# Patient Record
Sex: MALE | Race: WHITE | NOT HISPANIC OR LATINO | Employment: OTHER | URBAN - METROPOLITAN AREA
[De-identification: names, ages, dates, MRNs, and addresses within clinical notes are randomized per-mention and may not be internally consistent; named-entity substitution may affect disease eponyms.]

---

## 2023-03-29 ENCOUNTER — APPOINTMENT (EMERGENCY)
Dept: CT IMAGING | Facility: HOSPITAL | Age: 88
End: 2023-03-29

## 2023-03-29 ENCOUNTER — HOSPITAL ENCOUNTER (INPATIENT)
Facility: HOSPITAL | Age: 88
LOS: 3 days | Discharge: HOME | End: 2023-04-01
Attending: EMERGENCY MEDICINE | Admitting: INTERNAL MEDICINE

## 2023-03-29 DIAGNOSIS — R00.0 TACHYCARDIA: ICD-10-CM

## 2023-03-29 DIAGNOSIS — R09.02 HYPOXEMIA REQUIRING SUPPLEMENTAL OXYGEN: ICD-10-CM

## 2023-03-29 DIAGNOSIS — R06.00 DYSPNEA: ICD-10-CM

## 2023-03-29 DIAGNOSIS — Z99.81 HYPOXEMIA REQUIRING SUPPLEMENTAL OXYGEN: ICD-10-CM

## 2023-03-29 DIAGNOSIS — R77.8 ELEVATED TROPONIN: ICD-10-CM

## 2023-03-29 DIAGNOSIS — I26.99 BILATERAL PULMONARY EMBOLISM (HCC): Primary | ICD-10-CM

## 2023-03-29 PROBLEM — R79.89 ELEVATED TROPONIN: Status: ACTIVE | Noted: 2023-03-29

## 2023-03-29 LAB
2HR DELTA HS TROPONIN: 13 NG/L
4HR DELTA HS TROPONIN: 13 NG/L
ALBUMIN SERPL BCP-MCNC: 4.2 G/DL (ref 3.5–5)
ALP SERPL-CCNC: 85 U/L (ref 34–104)
ALT SERPL W P-5'-P-CCNC: 36 U/L (ref 7–52)
ANION GAP SERPL CALCULATED.3IONS-SCNC: 9 MMOL/L (ref 4–13)
APTT PPP: 202 SECONDS (ref 23–37)
APTT PPP: 30 SECONDS (ref 23–37)
AST SERPL W P-5'-P-CCNC: 32 U/L (ref 13–39)
ATRIAL RATE: 92 BPM
ATRIAL RATE: 98 BPM
BASOPHILS # BLD AUTO: 0.06 THOUSANDS/ÂΜL (ref 0–0.1)
BASOPHILS NFR BLD AUTO: 1 % (ref 0–1)
BILIRUB SERPL-MCNC: 0.79 MG/DL (ref 0.2–1)
BUN SERPL-MCNC: 24 MG/DL (ref 5–25)
CALCIUM SERPL-MCNC: 9.5 MG/DL (ref 8.4–10.2)
CARDIAC TROPONIN I PNL SERPL HS: 50 NG/L
CARDIAC TROPONIN I PNL SERPL HS: 63 NG/L
CARDIAC TROPONIN I PNL SERPL HS: 63 NG/L
CHLORIDE SERPL-SCNC: 110 MMOL/L (ref 96–108)
CO2 SERPL-SCNC: 23 MMOL/L (ref 21–32)
CREAT SERPL-MCNC: 1.08 MG/DL (ref 0.6–1.3)
D DIMER PPP FEU-MCNC: >20 UG/ML FEU
EOSINOPHIL # BLD AUTO: 0.05 THOUSAND/ÂΜL (ref 0–0.61)
EOSINOPHIL NFR BLD AUTO: 0 % (ref 0–6)
ERYTHROCYTE [DISTWIDTH] IN BLOOD BY AUTOMATED COUNT: 13.8 % (ref 11.6–15.1)
FLUAV RNA RESP QL NAA+PROBE: NEGATIVE
FLUBV RNA RESP QL NAA+PROBE: NEGATIVE
GFR SERPL CREATININE-BSD FRML MDRD: 61 ML/MIN/1.73SQ M
GLUCOSE SERPL-MCNC: 86 MG/DL (ref 65–140)
HCT VFR BLD AUTO: 44.7 % (ref 36.5–49.3)
HGB BLD-MCNC: 15.1 G/DL (ref 12–17)
IMM GRANULOCYTES # BLD AUTO: 0.04 THOUSAND/UL (ref 0–0.2)
IMM GRANULOCYTES NFR BLD AUTO: 0 % (ref 0–2)
INR PPP: 1.17 (ref 0.84–1.19)
LACTATE SERPL-SCNC: 1.5 MMOL/L (ref 0.5–2)
LACTATE SERPL-SCNC: 2.5 MMOL/L (ref 0.5–2)
LYMPHOCYTES # BLD AUTO: 2.7 THOUSANDS/ÂΜL (ref 0.6–4.47)
LYMPHOCYTES NFR BLD AUTO: 22 % (ref 14–44)
MCH RBC QN AUTO: 32.1 PG (ref 26.8–34.3)
MCHC RBC AUTO-ENTMCNC: 33.8 G/DL (ref 31.4–37.4)
MCV RBC AUTO: 95 FL (ref 82–98)
MONOCYTES # BLD AUTO: 1.13 THOUSAND/ÂΜL (ref 0.17–1.22)
MONOCYTES NFR BLD AUTO: 9 % (ref 4–12)
NEUTROPHILS # BLD AUTO: 8.12 THOUSANDS/ÂΜL (ref 1.85–7.62)
NEUTS SEG NFR BLD AUTO: 68 % (ref 43–75)
NRBC BLD AUTO-RTO: 0 /100 WBCS
P AXIS: 72 DEGREES
P AXIS: 75 DEGREES
PLATELET # BLD AUTO: 172 THOUSANDS/UL (ref 149–390)
PMV BLD AUTO: 11.2 FL (ref 8.9–12.7)
POTASSIUM SERPL-SCNC: 4.4 MMOL/L (ref 3.5–5.3)
PR INTERVAL: 146 MS
PR INTERVAL: 154 MS
PROCALCITONIN SERPL-MCNC: 0.05 NG/ML
PROT SERPL-MCNC: 7.2 G/DL (ref 6.4–8.4)
PROTHROMBIN TIME: 14.9 SECONDS (ref 11.6–14.5)
QRS AXIS: 93 DEGREES
QRS AXIS: 95 DEGREES
QRSD INTERVAL: 74 MS
QRSD INTERVAL: 78 MS
QT INTERVAL: 362 MS
QT INTERVAL: 390 MS
QTC INTERVAL: 462 MS
QTC INTERVAL: 482 MS
RBC # BLD AUTO: 4.7 MILLION/UL (ref 3.88–5.62)
RSV RNA RESP QL NAA+PROBE: NEGATIVE
SARS-COV-2 RNA RESP QL NAA+PROBE: NEGATIVE
SODIUM SERPL-SCNC: 142 MMOL/L (ref 135–147)
T WAVE AXIS: -19 DEGREES
T WAVE AXIS: -9 DEGREES
VENTRICULAR RATE: 92 BPM
VENTRICULAR RATE: 98 BPM
WBC # BLD AUTO: 12.1 THOUSAND/UL (ref 4.31–10.16)

## 2023-03-29 RX ORDER — ATORVASTATIN CALCIUM 20 MG/1
20 TABLET, FILM COATED ORAL DAILY
Status: DISCONTINUED | OUTPATIENT
Start: 2023-03-30 | End: 2023-04-01 | Stop reason: HOSPADM

## 2023-03-29 RX ORDER — ONDANSETRON 2 MG/ML
4 INJECTION INTRAMUSCULAR; INTRAVENOUS EVERY 6 HOURS PRN
Status: DISCONTINUED | OUTPATIENT
Start: 2023-03-29 | End: 2023-04-01 | Stop reason: HOSPADM

## 2023-03-29 RX ORDER — LEVOFLOXACIN 5 MG/ML
750 INJECTION, SOLUTION INTRAVENOUS ONCE
Status: COMPLETED | OUTPATIENT
Start: 2023-03-29 | End: 2023-03-29

## 2023-03-29 RX ORDER — PANTOPRAZOLE SODIUM 40 MG/1
40 TABLET, DELAYED RELEASE ORAL
Status: DISCONTINUED | OUTPATIENT
Start: 2023-03-30 | End: 2023-04-01 | Stop reason: HOSPADM

## 2023-03-29 RX ORDER — ASPIRIN 81 MG/1
243 TABLET, CHEWABLE ORAL ONCE
Status: COMPLETED | OUTPATIENT
Start: 2023-03-29 | End: 2023-03-29

## 2023-03-29 RX ORDER — MELATONIN
1000 DAILY
Status: DISCONTINUED | OUTPATIENT
Start: 2023-03-30 | End: 2023-04-01 | Stop reason: HOSPADM

## 2023-03-29 RX ORDER — ATORVASTATIN CALCIUM 20 MG/1
20 TABLET, FILM COATED ORAL DAILY
COMMUNITY
Start: 2022-12-12

## 2023-03-29 RX ORDER — DOCUSATE SODIUM 100 MG/1
100 CAPSULE, LIQUID FILLED ORAL 2 TIMES DAILY
Status: DISCONTINUED | OUTPATIENT
Start: 2023-03-29 | End: 2023-04-01 | Stop reason: HOSPADM

## 2023-03-29 RX ORDER — HEPARIN SODIUM 1000 [USP'U]/ML
6800 INJECTION, SOLUTION INTRAVENOUS; SUBCUTANEOUS EVERY 6 HOURS PRN
Status: DISCONTINUED | OUTPATIENT
Start: 2023-03-29 | End: 2023-04-01

## 2023-03-29 RX ORDER — HEPARIN SODIUM 1000 [USP'U]/ML
3400 INJECTION, SOLUTION INTRAVENOUS; SUBCUTANEOUS EVERY 6 HOURS PRN
Status: DISCONTINUED | OUTPATIENT
Start: 2023-03-29 | End: 2023-04-01

## 2023-03-29 RX ORDER — HEPARIN SODIUM 10000 [USP'U]/100ML
3-30 INJECTION, SOLUTION INTRAVENOUS
Status: DISCONTINUED | OUTPATIENT
Start: 2023-03-29 | End: 2023-04-01

## 2023-03-29 RX ORDER — CALCIUM CARBONATE/VITAMIN D3 600 MG-10
1200 TABLET ORAL DAILY
COMMUNITY

## 2023-03-29 RX ORDER — ACETAMINOPHEN 325 MG/1
650 TABLET ORAL EVERY 6 HOURS PRN
Status: DISCONTINUED | OUTPATIENT
Start: 2023-03-29 | End: 2023-04-01 | Stop reason: HOSPADM

## 2023-03-29 RX ORDER — ASPIRIN 81 MG/1
81 TABLET ORAL DAILY
COMMUNITY
End: 2023-04-07 | Stop reason: ALTCHOICE

## 2023-03-29 RX ORDER — ASPIRIN 81 MG/1
81 TABLET ORAL DAILY
Status: DISCONTINUED | OUTPATIENT
Start: 2023-03-30 | End: 2023-04-01 | Stop reason: HOSPADM

## 2023-03-29 RX ORDER — CHLORAL HYDRATE 500 MG
1000 CAPSULE ORAL DAILY
Status: DISCONTINUED | OUTPATIENT
Start: 2023-03-30 | End: 2023-04-01 | Stop reason: HOSPADM

## 2023-03-29 RX ORDER — PANTOPRAZOLE SODIUM 40 MG/1
40 GRANULE, DELAYED RELEASE ORAL DAILY
COMMUNITY

## 2023-03-29 RX ORDER — HEPARIN SODIUM 1000 [USP'U]/ML
6800 INJECTION, SOLUTION INTRAVENOUS; SUBCUTANEOUS ONCE
Status: COMPLETED | OUTPATIENT
Start: 2023-03-29 | End: 2023-03-29

## 2023-03-29 RX ADMIN — HEPARIN SODIUM 18 UNITS/KG/HR: 10000 INJECTION, SOLUTION INTRAVENOUS at 16:52

## 2023-03-29 RX ADMIN — ASPIRIN 243 MG: 81 TABLET, CHEWABLE ORAL at 13:46

## 2023-03-29 RX ADMIN — LEVOFLOXACIN 750 MG: 5 INJECTION, SOLUTION INTRAVENOUS at 13:54

## 2023-03-29 RX ADMIN — IOHEXOL 100 ML: 350 INJECTION, SOLUTION INTRAVENOUS at 14:51

## 2023-03-29 RX ADMIN — DOCUSATE SODIUM 100 MG: 100 CAPSULE, LIQUID FILLED ORAL at 21:32

## 2023-03-29 RX ADMIN — SODIUM CHLORIDE 500 ML: 0.9 INJECTION, SOLUTION INTRAVENOUS at 13:48

## 2023-03-29 RX ADMIN — HEPARIN SODIUM 6800 UNITS: 1000 INJECTION INTRAVENOUS; SUBCUTANEOUS at 16:46

## 2023-03-29 NOTE — ED PROVIDER NOTES
"History  Chief Complaint   Patient presents with   • Shortness of Breath     Exertional dyspnea worsening since February after he was diagnosed with Covid  Patient is an 59-year-old male who presents for shortness of breath  Patient reports that he had COVID at the beginning of February and after that felt like he never returned to his baseline  However, over the past 3 weeks has had worsening of his shortness of breath  Daughter states that when he comes to visit she noticed that he is increasingly shortness of breath particularly with stairs or walking  Patient reports that he can get short of breath while sitting but is often with walking or even mild exertion  Patient does also report intermittent lightheadedness with \"moving too fast\"  Patient denies any associated chest pain or chest tightness  Patient denies any fevers or chills  Patient denies any abdominal pain, nausea, vomiting, diarrhea  Patient denies any urinary symptoms including dysuria or hematuria  Patient denies any swelling in his lower extremities  Patient denies any history of VTE  Patient reports history of a single cardiac stent approximately 8 years ago but denies any other cardiac history  Patient is currently on aspirin but no blood thinners  Patient reports that he takes his medications regularly  Prior to Admission Medications   Prescriptions Last Dose Informant Patient Reported? Taking?    Cholecalciferol 50 MCG (2000 UT) CAPS   Yes Yes   Sig: Take 1 capsule by mouth daily with lunch   Multiple Vitamin (MULTIVITAMIN PO)   Yes Yes   Sig: Take 1 capsule by mouth daily   Omega 3 1200 MG CAPS   Yes Yes   Sig: Take 1,200 mg by mouth in the morning   aspirin (ECOTRIN LOW STRENGTH) 81 mg EC tablet   Yes Yes   Sig: Take 81 mg by mouth daily   atorvastatin (LIPITOR) 20 mg tablet   Yes Yes   Sig: Take 20 mg by mouth daily   metoprolol tartrate (LOPRESSOR) 25 mg tablet   Yes Yes   Sig: Take 12 5 mg by mouth daily " pantoprazole (PROTONIX) 40 mg   Yes Yes   Sig: Take 40 mg by mouth in the morning      Facility-Administered Medications: None       Past Medical History:   Diagnosis Date   • BPH (benign prostatic hyperplasia)    • GERD (gastroesophageal reflux disease)    • HLD (hyperlipidemia)    • Hypertension    • Nuclear sclerosis of right eye        Past Surgical History:   Procedure Laterality Date   • CARPAL TUNNEL RELEASE Left    • COLONOSCOPY     • EYE SURGERY Left    • KNEE SURGERY Left    • TRANSURETHRAL RESECTION OF PROSTATE         History reviewed  No pertinent family history  I have reviewed and agree with the history as documented  E-Cigarette/Vaping   • E-Cigarette Use Never User      E-Cigarette/Vaping Substances     Social History     Tobacco Use   • Smoking status: Never   • Smokeless tobacco: Never   Vaping Use   • Vaping Use: Never used   Substance Use Topics   • Alcohol use: Never   • Drug use: Not Currently        Review of Systems   Respiratory: Positive for shortness of breath  Neurological: Positive for light-headedness  All other systems reviewed and are negative  Physical Exam  ED Triage Vitals [03/29/23 1152]   Temperature Pulse Respirations Blood Pressure SpO2   97 8 °F (36 6 °C) (!) 111 (!) 26 151/85 (!) 85 %      Temp Source Heart Rate Source Patient Position - Orthostatic VS BP Location FiO2 (%)   Oral Monitor Sitting Right arm --      Pain Score       No Pain             Orthostatic Vital Signs  Vitals:    03/29/23 1203 03/29/23 1311 03/29/23 1400 03/29/23 1559   BP:  133/75 144/79 147/89   Pulse: 101 81 75 87   Patient Position - Orthostatic VS:    Lying       Physical Exam  Vitals reviewed  Constitutional:       General: He is not in acute distress  Appearance: He is well-developed  He is not toxic-appearing or diaphoretic  HENT:      Head: Normocephalic and atraumatic        Right Ear: External ear normal       Left Ear: External ear normal       Nose: Nose normal  Mouth/Throat:      Mouth: Mucous membranes are moist       Pharynx: Oropharynx is clear  Eyes:      Conjunctiva/sclera: Conjunctivae normal    Cardiovascular:      Rate and Rhythm: Regular rhythm  Tachycardia present  Heart sounds: Normal heart sounds  No murmur heard  Pulmonary:      Effort: Pulmonary effort is normal  No respiratory distress  Breath sounds: Normal breath sounds  No stridor  No wheezing, rhonchi or rales  Abdominal:      General: There is no distension  Palpations: Abdomen is soft  Tenderness: There is no abdominal tenderness  Musculoskeletal:         General: Normal range of motion  Cervical back: Normal range of motion and neck supple  Right lower leg: No edema  Left lower leg: No edema  Skin:     General: Skin is warm and dry  Capillary Refill: Capillary refill takes less than 2 seconds  Coloration: Skin is not pale  Findings: No erythema or rash  Neurological:      Mental Status: He is alert and oriented to person, place, and time  Sensory: No sensory deficit  Coordination: Coordination normal    Psychiatric:         Speech: Speech normal          Behavior: Behavior is cooperative           ED Medications  Medications   heparin (porcine) injection 6,800 Units (has no administration in time range)   heparin (porcine) 25,000 units in 0 45% NaCl 250 mL infusion (premix) (has no administration in time range)   heparin (porcine) injection 6,800 Units (has no administration in time range)   heparin (porcine) injection 3,400 Units (has no administration in time range)   aspirin chewable tablet 243 mg (243 mg Oral Given 3/29/23 1346)   sodium chloride 0 9 % bolus 500 mL (500 mL Intravenous New Bag 3/29/23 1348)   levofloxacin (LEVAQUIN) IVPB (premix in dextrose) 750 mg 150 mL (750 mg Intravenous New Bag 3/29/23 1354)   iohexol (OMNIPAQUE) 350 MG/ML injection (SINGLE-DOSE) 100 mL (100 mL Intravenous Given 3/29/23 1451) Diagnostic Studies  Results Reviewed     Procedure Component Value Units Date/Time    HS Troponin I 2hr [[id number]]  (Abnormal) Collected: 03/29/23 1531    Lab Status: Final result Specimen: Blood from Arm, Right Updated: 03/29/23 1611     hs TnI 2hr 63 ng/L      Delta 2hr hsTnI 13 ng/L     Lactic acid 2 Hours [[id number]]  (Normal) Collected: 03/29/23 1531    Lab Status: Final result Specimen: Blood from Arm, Right Updated: 03/29/23 1557     LACTIC ACID 1 5 mmol/L     Narrative:      Result may be elevated if tourniquet was used during collection  HS Troponin I 4hr [[id number]]     Lab Status: No result Specimen: Blood     Procalcitonin [[id number]]  (Normal) Collected: 03/29/23 1234    Lab Status: Final result Specimen: Blood from Arm, Left Updated: 03/29/23 1340     Procalcitonin 0 05 ng/ml     FLU/RSV/COVID - if FLU/RSV clinically relevant [[id number]]  (Normal) Collected: 03/29/23 1239    Lab Status: Final result Specimen: Nares from Nose Updated: 03/29/23 1328     SARS-CoV-2 Negative     INFLUENZA A PCR Negative     INFLUENZA B PCR Negative     RSV PCR Negative    Narrative:      FOR PEDIATRIC PATIENTS - copy/paste COVID Guidelines URL to browser: [email or web address]  [email or web address]    SARS-CoV-2 assay is a Nucleic Acid Amplification assay intended for the  qualitative detection of nucleic acid from SARS-CoV-2 in nasopharyngeal  swabs  Results are for the presumptive identification of SARS-CoV-2 RNA  Positive results are indicative of infection with SARS-CoV-2, the virus  causing COVID-19, but do not rule out bacterial infection or co-infection  with other viruses  Laboratories within the [place] and its  territories are required to report all positive results to the appropriate  public health authorities  Negative results do not preclude SARS-CoV-2  infection and should not be used as the sole basis for treatment or other  patient management decisions  Negative results must be combined with  clinical observations, patient history, and epidemiological information  This test has not been FDA cleared or approved  This test has been authorized by FDA under an Emergency Use Authorization  (EUA)  This test is only authorized for the duration of time the  declaration that circumstances exist justifying the authorization of the  emergency use of an in vitro diagnostic tests for detection of SARS-CoV-2  virus and/or diagnosis of COVID-19 infection under section 564(b)(1) of  the Act, 21 U  S C  050NDQ-3(K)(0), unless the authorization is terminated  or revoked sooner  The test has been validated but independent review by FDA  and CLIA is pending  Test performed using [employer] GeneXpert: This RT-PCR assay targets N2,  a region unique to SARS-CoV-2  A conserved region in the E-gene was chosen  for pan-Sarbecovirus detection which includes SARS-CoV-2  According to CMS-2020-01-R, this platform meets the definition of high-throughput technology  APTT [[id number]]  (Normal) Collected: 03/29/23 1234    Lab Status: Final result Specimen: Blood from Arm, Left Updated: 03/29/23 1327     PTT 30 seconds     D-Dimer [[id number]]  (Abnormal) Collected: 03/29/23 1234    Lab Status: Final result Specimen: Blood from Arm, Left Updated: 03/29/23 1327     D-Dimer, Quant >20 00 ug/ml FEU     Narrative: In the evaluation for possible pulmonary embolism, in the appropriate (Well's Score of 4 or less) patient, the age adjusted d-dimer cutoff for this patient can be calculated as:    Age x 0 01 (in ug/mL) for Age-adjusted D-dimer exclusion threshold for a patient over 50 years      Protime-INR [[id number]]  (Abnormal) Collected: 03/29/23 1234    Lab Status: Final result Specimen: Blood from Arm, Left Updated: 03/29/23 1327     Protime 14 9 seconds      INR 1 17    Lactic acid [[id number]]  (Abnormal) Collected: 03/29/23 1234    Lab Status: Final result Specimen: Blood from Arm, Left Updated: 03/29/23 1326     LACTIC ACID 2 5 mmol/L     Narrative:      Result may be elevated if tourniquet was used during collection  HS Troponin 0hr (reflex protocol) [[id number]]  (Abnormal) Collected: 03/29/23 1234    Lab Status: Final result Specimen: Blood from Arm, Left Updated: 03/29/23 1311     hs TnI 0hr 50 ng/L     Comprehensive metabolic panel [[id number]]  (Abnormal) Collected: 03/29/23 1234    Lab Status: Final result Specimen: Blood from Arm, Left Updated: 03/29/23 1307     Sodium 142 mmol/L      Potassium 4 4 mmol/L      Chloride 110 mmol/L      CO2 23 mmol/L      ANION GAP 9 mmol/L      BUN 24 mg/dL      Creatinine 1 08 mg/dL      Glucose 86 mg/dL      Calcium 9 5 mg/dL      AST 32 U/L      ALT 36 U/L      Alkaline Phosphatase 85 U/L      Total Protein 7 2 g/dL      Albumin 4 2 g/dL      Total Bilirubin 0 79 mg/dL      eGFR 61 ml/min/1 73sq m     Narrative:      [place] guidelines for Chronic Kidney Disease (CKD):   •  Stage 1 with normal or high GFR (GFR > 90 mL/min/1 73 square meters)  •  Stage 2 Mild CKD (GFR = 60-89 mL/min/1 73 square meters)  •  Stage 3A Moderate CKD (GFR = 45-59 mL/min/1 73 square meters)  •  Stage 3B Moderate CKD (GFR = 30-44 mL/min/1 73 square meters)  •  Stage 4 Severe CKD (GFR = 15-29 mL/min/1 73 square meters)  •  Stage 5 End Stage CKD (GFR <15 mL/min/1 73 square meters)  Note: GFR calculation is accurate only with a steady state creatinine    Blood culture #2 [[id number]] Collected: 03/29/23 1244    Lab Status:  In process Specimen: Blood from Hand, Right Updated: 03/29/23 1249    CBC and differential [[id number]]  (Abnormal) Collected: 03/29/23 1234    Lab Status: Final result Specimen: Blood from Arm, Left Updated: 03/29/23 1248     WBC 12 10 Thousand/uL      RBC 4 70 Million/uL      Hemoglobin 15 1 g/dL      Hematocrit 44 7 %      MCV 95 fL      MCH 32 1 pg      MCHC 33 8 g/dL      RDW 13 8 %      MPV 11 2 fL      Platelets 619 Thousands/uL nRBC 0 /100 WBCs      Neutrophils Relative 68 %      Immat GRANS % 0 %      Lymphocytes Relative 22 %      Monocytes Relative 9 %      Eosinophils Relative 0 %      Basophils Relative 1 %      Neutrophils Absolute 8 12 Thousands/µL      Immature Grans Absolute 0 04 Thousand/uL      Lymphocytes Absolute 2 70 Thousands/µL      Monocytes Absolute 1 13 Thousand/µL      Eosinophils Absolute 0 05 Thousand/µL      Basophils Absolute 0 06 Thousands/µL     Blood culture #1 [[id number]] Collected: 03/29/23 1234    Lab Status: In process Specimen: Blood from Arm, Left Updated: 03/29/23 1245                 CTA ED chest PE study   ED Interpretation by ST SOBIA DO (03/29 4989)   Abnormal   Interpreted by me as b/l PE  Will order anticoagulation  A/w radiologist interpretation  Final Result by Tramaine Najera MD (03/29 4100)      Bilateral acute occlusive pulmonary emboli extending from the distal lobar pulmonary arteries into the segmental and subsegmental branches  The calculated ratio of right ventricular to left ventricular diameter (RV/LV ratio) is 1 4      This is greater than 0 9, which is abnormal and indicates right heart strain  An abnormal RV/LV ratio has been shown to be associated with an increased risk of 30 day mortality in the setting of acute pulmonary embolism  Findings concur with the preliminary report by the referring clinician already in PACS   The study was marked in [facility] for immediate notification  and/or our electronic record EPIC  Workstation performed: [id number]               Procedures  Procedures      ED Course  ED Course as of 03/29/23 1614   Wed Mar 29, 2023   1216 SpO2(!): 85 %  On RA   1216 SpO2: 96 %  Improved with 4L NC   1216 Temperature: 97 8 °F (36 6 °C)  Afebrile   1216 Pulse(!): 111  Tachycardic  This along with tachypnea meeting SIRS criteria   Will initiate sepsis w/u including BC x2     1217 ECG 12 lead  Procedure Note: EKG  Date/Time: 03/29/23 12:18 PM   Interpreted by: Carmel Vitale MD  Indications / Diagnosis: Tachycardia  ECG reviewed by me, the ED Physician: yes   The EKG demonstrates:  Rhythm: normal sinus with occasional PVCs  Intervals: normal intervals  Axis: Borderline RAD  QRS/Blocks: normal QRS  ST Changes: No acute ST Changes, no STD/CHASIDY  T wave inversions present in inferior and septolateral leads  No prior available for comparison  1308 WBC(!): 12 10  Elevated  Septic w/u already initiated  Improved when compared to outpt labs earlier this week with WBC 13     1308 CBC and differential(!)  Reviewed and without actionable derangement  1308 Comprehensive metabolic panel(!)  Reviewed and without actionable derangement  1313 hs TnI 0hr(!): 50  Elevated  Will require delta  Will also give ASA  1328 D-Dimer, Quant(!): >20 00  Will order CTA for further w/u for PE     1328 LACTIC ACID(!!): 2 5  Will order fluids and initiate abx     1328 POCT INR: 1 17  WNL   1328 PTT: 30  WNL   1328 FLU/RSV/COVID - if FLU/RSV clinically relevant  Negative  1342 Procalcitonin: 0 05  Negative  46 Pt and daughter updated on results thus far and plan for ASA, abx, and CTA  Both agreeable  1502 CTA ED chest PE study  Significant PEs on wet read  Will initiate VTE heparin  0 Pt and family made aware of wet read of CTA and plan for heparin  All agreeable  Heparin ordered  Awaiting formal read of CTA prior to dispo  0 CTA ED chest PE study  Bilateral acute occlusive pulmonary emboli extending from the distal lobar pulmonary arteries into the segmental and subsegmental branches  The calculated ratio of right ventricular to left ventricular diameter (RV/LV ratio) is 1 4   1529 CC contacted via TT     1551 CC reviewed and stated okay for SD2  Will reach out to [facility]  1558 LACTIC ACID: 1 5  Interval improvement  [address] contacted via TT for admission  [address] hsTnI: 13  Will continue to trend  "            HEART Risk Score    Flowsheet Row Most Recent Value   Heart Score Risk Calculator    History 1 Filed at: 03/29/2023 1314   ECG 1 Filed at: 03/29/2023 1314   Age 2 Filed at: 03/29/2023 1314   Risk Factors 2 Filed at: 03/29/2023 1314   Troponin 2 Filed at: 03/29/2023 1314   HEART Score 8 Filed at: 03/29/2023 1314                   Initial Sepsis Screening     Row Name 03/29/23 1402 03/29/23 1346             Is the patient's history suggestive of a new or worsening infection? Yes (Proceed)  -CR Yes (Proceed)  -KW       Suspected source of infection pneumonia  -CR suspect infection, source unknown  -KW       Indicate SIRS criteria Tachycardia > 90 bpm;Tachypnea > 20 resp per min;Leukocytosis (WBC > 57937 IJL) OR Leukopenia (WBC <4000 IJL) OR Bandemia (WBC >10% bands)  -CR Leukocytosis (WBC > 61633 IJL) OR Leukopenia (WBC <4000 IJL) OR Bandemia (WBC >10% bands); Tachycardia > 90 bpm;Tachypnea > 20 resp per min  -KW       Are two or more of the above signs & symptoms of infection both present and new to the patient? Yes (Proceed)  -CR Yes (Proceed)  -KW       Assess for evidence of organ dysfunction: Are any of the below criteria present within 6 hours of suspected infection and SIRS criteria that are NOT considered to be chronic conditions? Lactate > 2 0  -CR Lactate > 2 0  -KW       Date of presentation of severe sepsis 03/29/23  -CR 03/29/23  -KW       Time of presentation of severe sepsis 1328  -CR 1320  -KW       Sepsis Note: Click \"NEXT\" below (NOT \"close\") to generate sepsis note based on above information   YES (proceed by clicking \"NEXT\")  -CR YES (proceed by clicking \"NEXT\")  -KW             User Key  (r) = Recorded By, (t) = Taken By, (c) = Cosigned By    [address] Name Provider Type    CR [address], DO Physician    Romina Paredes MD Resident              Default Flowsheet Data (last 720 hours)     Sepsis Reassess     Row Name 03/29/23 1502                   Repeat Volume Status and Tissue " "Perfusion Assessment Performed    Date of Reassessment: 03/29/23  -KW        Time of Reassessment: 1504  -KW        Sepsis Reassessment Note: Click \"NEXT\" below (NOT \"close\") to generate sepsis reassessment note  YES (proceed by clicking \"NEXT\")  -KW        Repeat Volume Status and Tissue Perfusion Assessment Performed --              User Key  (r) = Recorded By, (t) = Taken By, (c) = Cosigned By    [address] Name Provider Vanita Basilio MD Resident              SBIRT 22yo+    Flowsheet Row Most Recent Value   SBIRT (23 yo +)    In order to provide better care to our patients, we are screening all of our patients for alcohol and drug use  Would it be okay to ask you these screening questions? Yes Filed at: 03/29/2023 1235   Initial Alcohol Screen: US AUDIT-C     1  How often do you have a drink containing alcohol? 0 Filed at: 03/29/2023 1235   2  How many drinks containing alcohol do you have on a typical day you are drinking? 0 Filed at: 03/29/2023 1235   3a  Male UNDER 65: How often do you have five or more drinks on one occasion? 0 Filed at: 03/29/2023 1235   3b  FEMALE Any Age, or MALE 65+: How often do you have 4 or more drinks on one occassion? 0 Filed at: 03/29/2023 1235   Audit-C Score 0 Filed at: 03/29/2023 1235   CASSANDRA: How many times in the past year have you    Used an illegal drug or used a prescription medication for non-medical reasons? Never Filed at: 03/29/2023 1235                Medical Decision Making  Pt is a 79yo M who presents with shortness of breath  Exam pertinent for tachycardia  Differential diagnosis to include but not limited to pneumonia, viral illness, PE, anemia, post-COVID syndrome, pleural effusion, pneumothorax  Will plan for sepsis labs in setting of tachycardia and tachypnea  Will also obtain EKG and troponin due to cardiac history  Will obtain D-dimer to rule out PE    Patient largely asymptomatic and does not require any intervention at this time aside from " nasal cannula which is already in place  See ED course for results and details  Plan to admit pt to [facility]  Pt discussed with admitting team and admission orders placed  Pt admitted without incident  Amount and/or Complexity of Data Reviewed  Labs: ordered  Decision-making details documented in ED Course  Radiology: ordered and independent interpretation performed  Decision-making details documented in ED Course  ECG/medicine tests:  Decision-making details documented in ED Course  Risk  OTC drugs  Prescription drug management  Decision regarding hospitalization  Disposition  Final diagnoses:   Dyspnea   Hypoxemia requiring supplemental oxygen   Tachycardia   Bilateral pulmonary embolism (HCC)   Elevated troponin     Time reflects when diagnosis was documented in both MDM as applicable and the Disposition within this note     Time User Action Codes Description Comment    3/29/2023 12:23 PM Sydna Rang Add [R06 00] Dyspnea     3/29/2023 12:23 PM [place] Maisha [R09 02,  Z99 81] Hypoxemia requiring supplemental oxygen     3/29/2023 12:23 PM Sydna Rang Add [R00 0] Tachycardia     3/29/2023  3:46 PM Juan, [facility] Add [I26 99] Bilateral pulmonary embolism ([facility] [address] )     3/29/2023  3:46 PM Juan, [facility] [R06 00] Dyspnea     3/29/2023  3:46 PM Juan, Crystal L Modify [I26 99] Bilateral pulmonary embolism ([facility] [address] )     3/29/2023  4:13 PM Sydna Rang Add [R77 8] Elevated troponin       ED Disposition     ED Disposition   Admit    Condition   Stable    Date/Time   Wed Mar 29, 2023  4:13 PM    Comment   Case was discussed with ALEKSANDER and the patient's admission status was agreed to be Admission Status: inpatient status to the service of Dr Eddie Cutler  Follow-up Information    None         Patient's Medications   Discharge Prescriptions    No medications on file     No discharge procedures on file      PDMP Review     None           ED Provider  Attending physically available and evaluated Halle Correa I managed the patient along with the ED Attending      Electronically Signed by         Fabian Marte MD  03/29/23 6385

## 2023-03-29 NOTE — ED ATTENDING ATTESTATION
3/29/2023  IAmira DO, saw and evaluated the patient  I have discussed the patient with the resident/non-physician practitioner and agree with the resident's/non-physician practitioner's findings, Plan of Care, and MDM as documented in the resident's/non-physician practitioner's note, except where noted  All available labs and Radiology studies were reviewed  I was present for key portions of any procedure(s) performed by the resident/non-physician practitioner and I was immediately available to provide assistance  At this point I agree with the current assessment done in the Emergency Department  I have conducted an independent evaluation of this patient a history and physical is as follows:    80 y o  M w/h/o CAD s/p stent p/w BRADLEY x 2 months  Additional history also provided by family in room  Pt having SOB since being diagnosed with COVID last month  Having worsening SOB over the past few days  Pt saw his PCP 2 days ago and had labs/CXR that were unremarkable  Pt comes in today for not feeling well  Pt denies fevers, cough, CP, abd pain, N/V, LE edema  Plan: Labs including dimer  If dimer neg, will order CXR  ED Course  ED Course as of 03/29/23 1619   Wed Mar 29, 2023   1201 SpO2(!): 85 %  Hypoxic    Placed on 4L NC    1201 Respirations(!): 26  Tachypneic   1201 Pulse(!): 111  Tachycardic   1250 WBC(!): 12 10  Elevated         Critical Care Time  CriticalCare Time    Date/Time: 3/29/2023 12:43 PM  Performed by: DO Amira  Authorized by: DO Amira     Critical care provider statement:     Critical care time (minutes):  75    Critical care time was exclusive of:  Separately billable procedures and treating other patients and teaching time    Critical care was necessary to treat or prevent imminent or life-threatening deterioration of the following conditions:  Respiratory failure    Critical care was time spent personally by me on the following activities: Blood draw for specimens, obtaining history from patient or surrogate, development of treatment plan with patient or surrogate, discussions with consultants, evaluation of patient's response to treatment, examination of patient, ordering and performing treatments and interventions, ordering and review of laboratory studies, ordering and review of radiographic studies, re-evaluation of patient's condition and review of old charts    I assumed direction of critical care for this patient from another provider in my specialty: no    Comments:      Pt with hypoxia requiring O2 supplementation and significant PEs requiring anticoagulation

## 2023-03-29 NOTE — ASSESSMENT & PLAN NOTE
Troponins noted to be elevated in the ED; 50--63--63  Stable; EKG with some T wave abnormality; but no other acute ischemic findings  Patient denies chest pain  Troponins likely elevated in the setting of demand ischemia given bilateral Pes  Monitor on telemetry; on heparin drip; consider consult to cardiology as needed

## 2023-03-29 NOTE — SEPSIS NOTE
"  Sepsis Note   Makayla Cano 80 y o  male MRN: [id number]  Unit/Bed#: ED-10 Encounter: [id number]       Initial Sepsis Screening     Row Name 03/29/23 1346                Is the patient's history suggestive of a new or worsening infection? Yes (Proceed)  -KW        Suspected source of infection suspect infection, source unknown  -KW        Indicate SIRS criteria Leukocytosis (WBC > 41981 IJL) OR Leukopenia (WBC <4000 IJL) OR Bandemia (WBC >10% bands); Tachycardia > 90 bpm;Tachypnea > 20 resp per min  -KW        Are two or more of the above signs & symptoms of infection both present and new to the patient? Yes (Proceed)  -KW        Assess for evidence of organ dysfunction: Are any of the below criteria present within 6 hours of suspected infection and SIRS criteria that are NOT considered to be chronic conditions? Lactate > 2 0  -KW        Date of presentation of severe sepsis 03/29/23  -KW        Time of presentation of severe sepsis 1320  -KW        Sepsis Note: Click \"NEXT\" below (NOT \"close\") to generate sepsis note based on above information  YES (proceed by clicking \"NEXT\")  -KW              User Key  (r) = Recorded By, (t) = Taken By, (c) = Cosigned By    [address] Name Provider Treasure Briones MD Resident                    Body mass index is 32 28 kg/m²    Wt Readings from Last 1 Encounters:   03/29/23 88 kg (194 lb 0 1 oz)     IBW (Ideal Body Weight): 61 5 kg    Ideal body weight: 61 5 kg (135 lb 9 3 oz)  Adjusted ideal body weight: 72 1 kg (158 lb 15 2 oz)  "

## 2023-03-29 NOTE — H&P
"[facility]  H&P  Name: Luba Garay  MRN: [id number]  Unit/Bed#: E4 -01 I Date of Admission: 3/29/2023   Date of Service: 3/29/2023 I Hospital Day: 0      Assessment/Plan   Elevated troponin  Assessment & Plan  Troponins noted to be elevated in the ED; 50--63--63  Stable; EKG with some T wave abnormality; but no other acute ischemic findings  Patient denies chest pain  Troponins likely elevated in the setting of demand ischemia given bilateral Pes  Monitor on telemetry; on heparin drip; consider consult to cardiology as needed    Hypertension  Assessment & Plan  Blood pressure well controlled on presentation, continue home Lopressor with hold parameters    HLD (hyperlipidemia)  Assessment & Plan  Continue statin therapy    GERD (gastroesophageal reflux disease)  Assessment & Plan  Continue PPI therapy    BPH (benign prostatic hyperplasia)  Assessment & Plan  Monitor ins and outs  Urinary retention protocol    * Acute pulmonary embolism without acute cor pulmonale (HCC)  Assessment & Plan  · Patient presents with several weeks of worsening shortness of breath; recent diagnosis of COVID 1 month ago  · In the ED noted to be tachycardic, tachypneic, with slight increase in white blood count; sepsis alert called  · However, subsequently found to have D-dimer > 20 00; CTA showed the following:  \"Bilateral acute occlusive pulmonary emboli extending from the distal lobar pulmonary arteries into the segmental and subsegmental branches  The calculated ratio of right ventricular to left ventricular diameter (RV/LV ratio) is 1 4  This is greater than 0 9, which is abnormal and indicates right heart strain  An abnormal RV/LV ratio has been shown to be associated with an increased risk of 30 day mortality in the setting of acute pulmonary embolism  \"  · Initiate on heparin drip; consider transition to NOAC pending insurance coverage  · Supportive care; requiring 2 L nasal cannula with SPO2 of " 97%  · Lactic acid trended from 2 5-1 5; procalcitonin negative  · Did receive antibiotics in the ED; will monitor off antibiotics at this time  · Repeat procalcitonin; consider restarting antibiotics as needed  · Telemetry; thrombosis panel  · Consider consult to pulmonology as needed  · Likely provoked in the setting of acute illness given recent COVID diagnosis and patient's inactivity at that time           VTE Prophylaxis: Heparin Drip  / sequential compression device   Code Status: Full  POLST: POLST form is not discussed and not completed at this time  Discussion with family: Discussed treatment plan with family and patient who agree with current plan; encouraged to ask questions and participate  Anticipated Length of Stay:  Patient will be admitted on an Inpatient basis with an anticipated length of stay of greater than 2 midnights  Justification for Hospital Stay: Bilateral pulmonary emboli    Total Time for Visit, including Counseling / Coordination of Care: 60 minutes  Greater than 50% of this total time spent on direct patient counseling and coordination of care  Chief Complaint:   Worsening shortness of breath x3 weeks    History of Present Illness:    Eric Reaves is a 80 y o  male BPH, GERD, hyperlipidemia, hypertension who presents with worsening shortness of breath and dyspnea on exertion X 3 weeks  Patient reports COVID diagnosis in February  Since that time, has not returned to 100% respiratory status  Over the last 3 weeks, however, this is gotten acutely worse has seen medical providers, but no alleviation of symptoms with inhalers or other treatments  In the ED, noted to have highly elevated D-dimer; CTA showed bilateral pulmonary emboli with right heart strain  Patient started on heparin drip and will be admitted overnight for observation  Patient also reports occasional headaches, but no history of cough, fever/chills, nausea/vomiting, diarrhea/constipation    Noted to be SIRS positive in the ED and given one-time dose of antibiotics, but likely tachycardia and tachypnea secondary to pulmonary emboli burden  Patient does report long period of inactivity during COVID illness; likely provoked emboli  Review of Systems:    Review of Systems   Constitutional: Positive for activity change  Negative for chills and fever  HENT: Negative for ear pain and sore throat  Eyes: Negative for pain and visual disturbance  Respiratory: Positive for shortness of breath  Negative for cough  Cardiovascular: Negative for chest pain and palpitations  Gastrointestinal: Negative for abdominal pain and vomiting  Genitourinary: Negative for dysuria and hematuria  Musculoskeletal: Negative for arthralgias and back pain  Skin: Negative for color change and rash  Neurological: Positive for headaches  Negative for seizures and syncope  All other systems reviewed and are negative  Past Medical and Surgical History:     Past Medical History:   Diagnosis Date   • BPH (benign prostatic hyperplasia)    • GERD (gastroesophageal reflux disease)    • HLD (hyperlipidemia)    • Hypertension    • Nuclear sclerosis of right eye        Past Surgical History:   Procedure Laterality Date   • CARPAL TUNNEL RELEASE Left    • COLONOSCOPY     • EYE SURGERY Left    • KNEE SURGERY Left    • TRANSURETHRAL RESECTION OF PROSTATE         Meds/Allergies:    Prior to Admission medications    Medication Sig Start Date End Date Taking?  Authorizing Provider   aspirin (ECOTRIN LOW STRENGTH) 81 mg EC tablet Take 81 mg by mouth daily   Yes Historical Provider, MD   atorvastatin (LIPITOR) 20 mg tablet Take 20 mg by mouth daily 12/12/22  Yes Historical Provider, MD   Cholecalciferol 50 MCG (2000 UT) CAPS Take 1 capsule by mouth daily with lunch   Yes Historical Provider, MD   metoprolol tartrate (LOPRESSOR) 25 mg tablet Take 12 5 mg by mouth daily 12/12/22  Yes Historical Provider, MD   Multiple Vitamin (MULTIVITAMIN PO) "Take 1 capsule by mouth daily   Yes Historical Provider, MD   Omega 3 1200 MG CAPS Take 1,200 mg by mouth in the morning   Yes Historical Provider, MD   pantoprazole (PROTONIX) 40 mg Take 40 mg by mouth in the morning   Yes Historical Provider, MD     I have reviewed home medications using allscripts  Allergies: Allergies   Allergen Reactions   • Cefaclor Other (See Comments)   • Ibuprofen Other (See Comments)   • Cephalosporins Rash   • Penicillins Rash   • Sulfa Antibiotics Other (See Comments) and Rash     Rash - takes flomax         Social History:     Marital Status:    Occupation:   Patient Pre-hospital Living Situation: Independently  Patient Pre-hospital Level of Mobility: Full  Patient Pre-hospital Diet Restrictions: None  Substance Use History:   Social History     Substance and Sexual Activity   Alcohol Use Never     Social History     Tobacco Use   Smoking Status Never   Smokeless Tobacco Never     Social History     Substance and Sexual Activity   Drug Use Not Currently       Family History:    History reviewed  No pertinent family history  Physical Exam:     Vitals:   Blood Pressure: (!) 141/102 (03/29/23 1832)  Pulse: 88 (03/29/23 1832)  Temperature: 97 8 °F (36 6 °C) (03/29/23 1832)  Temp Source: Temporal (03/29/23 1832)  Respirations: 20 (03/29/23 1832)  Height: 5' 5\" (165 1 cm) (03/29/23 1152)  Weight - Scale: 88 kg (194 lb 0 1 oz) (03/29/23 1152)  SpO2: 91 % (03/29/23 1832)    Physical Exam  Vitals and nursing note reviewed  Constitutional:       General: He is not in acute distress  Appearance: He is well-developed  HENT:      Head: Normocephalic and atraumatic  Eyes:      Conjunctiva/sclera: Conjunctivae normal    Cardiovascular:      Rate and Rhythm: Regular rhythm  Tachycardia present  Heart sounds: No murmur heard  Pulmonary:      Effort: No respiratory distress  Breath sounds: Normal breath sounds  Abdominal:      Palpations: Abdomen is soft        " Tenderness: There is no abdominal tenderness  Musculoskeletal:         General: No swelling  Cervical back: Neck supple  Skin:     General: Skin is warm and dry  Neurological:      Mental Status: He is alert and oriented to person, place, and time  Psychiatric:         Mood and Affect: Mood normal              Additional Data:     Lab Results: I have personally reviewed pertinent reports  Results from last 7 days   Lab Units 03/29/23  1234   WBC Thousand/uL 12 10*   HEMOGLOBIN g/dL 15 1   HEMATOCRIT % 44 7   PLATELETS Thousands/uL 172   NEUTROS PCT % 68   LYMPHS PCT % 22   MONOS PCT % 9   EOS PCT % 0     Results from last 7 days   Lab Units 03/29/23  1234   SODIUM mmol/L 142   POTASSIUM mmol/L 4 4   CHLORIDE mmol/L 110*   CO2 mmol/L 23   BUN mg/dL 24   CREATININE mg/dL 1 08   ANION GAP mmol/L 9   CALCIUM mg/dL 9 5   ALBUMIN g/dL 4 2   TOTAL BILIRUBIN mg/dL 0 79   ALK PHOS U/L 85   ALT U/L 36   AST U/L 32   GLUCOSE RANDOM mg/dL 86     Results from last 7 days   Lab Units 03/29/23  1234   INR  1 17             Results from last 7 days   Lab Units 03/29/23  1531 03/29/23  1234   LACTIC ACID mmol/L 1 5 2 5*   PROCALCITONIN ng/ml  --  0 05       Imaging: I have personally reviewed pertinent reports  CTA ED chest PE study   ED Interpretation by ST SOBIA DO (03/29 1503)   Abnormal   Interpreted by me as b/l PE  Will order anticoagulation  A/w radiologist interpretation  Final Result by Warren Collazo MD (03/29 1527)      Bilateral acute occlusive pulmonary emboli extending from the distal lobar pulmonary arteries into the segmental and subsegmental branches  The calculated ratio of right ventricular to left ventricular diameter (RV/LV ratio) is 1 4      This is greater than 0 9, which is abnormal and indicates right heart strain  An abnormal RV/LV ratio has been shown to be associated with an increased risk of 30 day mortality in the setting of acute pulmonary embolism  Findings concur with the preliminary report by the referring clinician already in PACS   The study was marked in [facility] for immediate notification  and/or our electronic record EPIC  Workstation performed: [id number]             EKG, Pathology, and Other Studies Reviewed on Admission:   · EKG: Sinus rhythm with marked sinus arrhythmia with occasional PVCs    Allscripts / Epic Records Reviewed: Yes     ** Please Note: This note has been constructed using a voice recognition system   **

## 2023-03-29 NOTE — SEPSIS NOTE
"  Sepsis Note   Jignesh García 80 y o  male MRN: [id number]  Unit/Bed#: ED-10 Encounter: [id number]       Initial Sepsis Screening     [address] Name 03/29/23 1402 03/29/23 1346             Is the patient's history suggestive of a new or worsening infection? Yes (Proceed)  -CR Yes (Proceed)  -KW       Suspected source of infection pneumonia  -CR suspect infection, source unknown  -KW       Indicate SIRS criteria Tachycardia > 90 bpm;Tachypnea > 20 resp per min;Leukocytosis (WBC > 31699 IJL) OR Leukopenia (WBC <4000 IJL) OR Bandemia (WBC >10% bands)  -CR Leukocytosis (WBC > 67085 IJL) OR Leukopenia (WBC <4000 IJL) OR Bandemia (WBC >10% bands); Tachycardia > 90 bpm;Tachypnea > 20 resp per min  -KW       Are two or more of the above signs & symptoms of infection both present and new to the patient? Yes (Proceed)  -CR Yes (Proceed)  -KW       Assess for evidence of organ dysfunction: Are any of the below criteria present within 6 hours of suspected infection and SIRS criteria that are NOT considered to be chronic conditions? Lactate > 2 0  -CR Lactate > 2 0  -KW       Date of presentation of severe sepsis 03/29/23  -CR 03/29/23  -KW       Time of presentation of severe sepsis 1328  -CR 1320  -KW       Sepsis Note: Click \"NEXT\" below (NOT \"close\") to generate sepsis note based on above information  YES (proceed by clicking \"NEXT\")  -CR YES (proceed by clicking \"NEXT\")  -KW             User Key  (r) = Recorded By, (t) = Taken By, (c) = Cosigned By    [address] Name Provider Type    CR Melvin Roberts DO Physician    Giuliana Reyes MD Resident                    Body mass index is 32 28 kg/m²    Wt Readings from Last 1 Encounters:   03/29/23 88 kg (194 lb 0 1 oz)     IBW (Ideal Body Weight): 61 5 kg    Ideal body weight: 61 5 kg (135 lb 9 3 oz)  Adjusted ideal body weight: 72 1 kg (158 lb 15 2 oz)  "

## 2023-03-29 NOTE — ASSESSMENT & PLAN NOTE
"· Patient presents with several weeks of worsening shortness of breath; recent diagnosis of COVID 1 month ago  · In the ED noted to be tachycardic, tachypneic, with slight increase in white blood count; sepsis alert called  · However, subsequently found to have D-dimer > 20 00; CTA showed the following:  \"Bilateral acute occlusive pulmonary emboli extending from the distal lobar pulmonary arteries into the segmental and subsegmental branches  The calculated ratio of right ventricular to left ventricular diameter (RV/LV ratio) is 1 4  This is greater than 0 9, which is abnormal and indicates right heart strain  An abnormal RV/LV ratio has been shown to be associated with an increased risk of 30 day mortality in the setting of acute pulmonary embolism  \"  · Initiate on heparin drip; consider transition to NOAC pending insurance coverage  · Supportive care; requiring 2 L nasal cannula with SPO2 of 97%  · Lactic acid trended from 2 5-1 5; procalcitonin negative  · Did receive antibiotics in the ED; will monitor off antibiotics at this time  · Repeat procalcitonin; consider restarting antibiotics as needed  · Telemetry; thrombosis panel  · Consider consult to pulmonology as needed  · Likely provoked in the setting of acute illness given recent COVID diagnosis and patient's inactivity at that time  "

## 2023-03-30 ENCOUNTER — APPOINTMENT (INPATIENT)
Dept: NON INVASIVE DIAGNOSTICS | Facility: HOSPITAL | Age: 88
End: 2023-03-30

## 2023-03-30 LAB
ALBUMIN SERPL BCP-MCNC: 4 G/DL (ref 3.5–5)
ALP SERPL-CCNC: 81 U/L (ref 34–104)
ALT SERPL W P-5'-P-CCNC: 33 U/L (ref 7–52)
ANION GAP SERPL CALCULATED.3IONS-SCNC: 9 MMOL/L (ref 4–13)
AORTIC ROOT: 3.3 CM
AORTIC VALVE MEAN VELOCITY: 8.4 M/S
APICAL FOUR CHAMBER EJECTION FRACTION: 56 %
APTT PPP: 130 SECONDS (ref 23–37)
APTT PPP: 74 SECONDS (ref 23–37)
APTT PPP: 79 SECONDS (ref 23–37)
ASCENDING AORTA: 3.7 CM
AST SERPL W P-5'-P-CCNC: 24 U/L (ref 13–39)
ATRIAL RATE: 91 BPM
AV AREA BY CONTINUOUS VTI: 2.3 CM2
AV AREA PEAK VELOCITY: 2.1 CM2
AV LVOT MEAN GRADIENT: 2 MMHG
AV LVOT PEAK GRADIENT: 4 MMHG
AV MEAN GRADIENT: 3 MMHG
AV PEAK GRADIENT: 7 MMHG
AV VALVE AREA: 2.3 CM2
AV VELOCITY RATIO: 0.73
BASOPHILS # BLD AUTO: 0.09 THOUSANDS/ÂΜL (ref 0–0.1)
BASOPHILS NFR BLD AUTO: 1 % (ref 0–1)
BILIRUB SERPL-MCNC: 0.82 MG/DL (ref 0.2–1)
BUN SERPL-MCNC: 19 MG/DL (ref 5–25)
CALCIUM SERPL-MCNC: 9.1 MG/DL (ref 8.4–10.2)
CHLORIDE SERPL-SCNC: 112 MMOL/L (ref 96–108)
CO2 SERPL-SCNC: 22 MMOL/L (ref 21–32)
CREAT SERPL-MCNC: 1.03 MG/DL (ref 0.6–1.3)
DOP CALC AO PEAK VEL: 1.28 M/S
DOP CALC AO VTI: 23.2 CM
DOP CALC LVOT AREA: 2.83 CM2
DOP CALC LVOT DIAMETER: 1.9 CM
DOP CALC LVOT PEAK VEL VTI: 18.83 CM
DOP CALC LVOT PEAK VEL: 0.94 M/S
DOP CALC LVOT STROKE INDEX: 28.2 ML/M2
DOP CALC LVOT STROKE VOLUME: 53.36
EOSINOPHIL # BLD AUTO: 0.21 THOUSAND/ÂΜL (ref 0–0.61)
EOSINOPHIL NFR BLD AUTO: 2 % (ref 0–6)
ERYTHROCYTE [DISTWIDTH] IN BLOOD BY AUTOMATED COUNT: 14 % (ref 11.6–15.1)
FRACTIONAL SHORTENING: 31 (ref 28–44)
GFR SERPL CREATININE-BSD FRML MDRD: 65 ML/MIN/1.73SQ M
GLUCOSE SERPL-MCNC: 105 MG/DL (ref 65–140)
HCT VFR BLD AUTO: 44.1 % (ref 36.5–49.3)
HGB BLD-MCNC: 14.5 G/DL (ref 12–17)
IMM GRANULOCYTES # BLD AUTO: 0.05 THOUSAND/UL (ref 0–0.2)
IMM GRANULOCYTES NFR BLD AUTO: 1 % (ref 0–2)
INTERVENTRICULAR SEPTUM IN DIASTOLE (PARASTERNAL SHORT AXIS VIEW): 1.1 CM
INTERVENTRICULAR SEPTUM: 1.1 CM (ref 0.6–1.1)
IVC: 24 MM
LAAS-AP2: 18.7 CM2
LAAS-AP4: 23.1 CM2
LEFT ATRIUM SIZE: 3.6 CM
LEFT INTERNAL DIMENSION IN SYSTOLE: 3.1 CM (ref 2.1–4)
LEFT VENTRICLE DIASTOLIC VOLUME (MOD BIPLANE): 76 ML
LEFT VENTRICLE SYSTOLIC VOLUME (MOD BIPLANE): 35 ML
LEFT VENTRICULAR INTERNAL DIMENSION IN DIASTOLE: 4.5 CM (ref 3.5–6)
LEFT VENTRICULAR POSTERIOR WALL IN END DIASTOLE: 1.1 CM
LEFT VENTRICULAR STROKE VOLUME: 54 ML
LV EF: 55 %
LVSV (TEICH): 54 ML
LYMPHOCYTES # BLD AUTO: 3.65 THOUSANDS/ÂΜL (ref 0.6–4.47)
LYMPHOCYTES NFR BLD AUTO: 34 % (ref 14–44)
MAGNESIUM SERPL-MCNC: 2.1 MG/DL (ref 1.9–2.7)
MCH RBC QN AUTO: 31.8 PG (ref 26.8–34.3)
MCHC RBC AUTO-ENTMCNC: 32.9 G/DL (ref 31.4–37.4)
MCV RBC AUTO: 97 FL (ref 82–98)
MONOCYTES # BLD AUTO: 0.98 THOUSAND/ÂΜL (ref 0.17–1.22)
MONOCYTES NFR BLD AUTO: 9 % (ref 4–12)
NEUTROPHILS # BLD AUTO: 5.67 THOUSANDS/ÂΜL (ref 1.85–7.62)
NEUTS SEG NFR BLD AUTO: 53 % (ref 43–75)
NRBC BLD AUTO-RTO: 0 /100 WBCS
P AXIS: 81 DEGREES
PA SYSTOLIC PRESSURE: 52 MMHG
PHOSPHATE SERPL-MCNC: 3.1 MG/DL (ref 2.3–4.1)
PLATELET # BLD AUTO: 175 THOUSANDS/UL (ref 149–390)
PMV BLD AUTO: 11.6 FL (ref 8.9–12.7)
POTASSIUM SERPL-SCNC: 4.2 MMOL/L (ref 3.5–5.3)
PR INTERVAL: 138 MS
PROCALCITONIN SERPL-MCNC: 0.06 NG/ML
PROT SERPL-MCNC: 6.7 G/DL (ref 6.4–8.4)
QRS AXIS: 90 DEGREES
QRSD INTERVAL: 86 MS
QT INTERVAL: 388 MS
QTC INTERVAL: 477 MS
RA PRESSURE ESTIMATED: 15 MMHG
RBC # BLD AUTO: 4.56 MILLION/UL (ref 3.88–5.62)
RIGHT ATRIUM AREA SYSTOLE A4C: 26 CM2
RIGHT VENTRICLE ID DIMENSION: 5.9 CM
RV PSP: 52 MMHG
SL CV LEFT ATRIUM LENGTH A2C: 5.2 CM
SL CV LV EF: 51
SL CV PED ECHO LEFT VENTRICLE DIASTOLIC VOLUME (MOD BIPLANE) 2D: 94 ML
SL CV PED ECHO LEFT VENTRICLE SYSTOLIC VOLUME (MOD BIPLANE) 2D: 39 ML
SODIUM SERPL-SCNC: 143 MMOL/L (ref 135–147)
T WAVE AXIS: -30 DEGREES
TR MAX PG: 37 MMHG
TR PEAK VELOCITY: 3.1 M/S
TRICUSPID ANNULAR PLANE SYSTOLIC EXCURSION: 1.9 CM
TRICUSPID VALVE PEAK REGURGITATION VELOCITY: 3.05 M/S
VENTRICULAR RATE: 91 BPM
WBC # BLD AUTO: 10.65 THOUSAND/UL (ref 4.31–10.16)

## 2023-03-30 RX ORDER — METOPROLOL TARTRATE 5 MG/5ML
5 INJECTION INTRAVENOUS EVERY 6 HOURS PRN
Status: DISCONTINUED | OUTPATIENT
Start: 2023-03-30 | End: 2023-04-01 | Stop reason: HOSPADM

## 2023-03-30 RX ADMIN — Medication 1000 UNITS: at 08:41

## 2023-03-30 RX ADMIN — ASPIRIN 81 MG: 81 TABLET, COATED ORAL at 08:42

## 2023-03-30 RX ADMIN — Medication 12.5 MG: at 08:41

## 2023-03-30 RX ADMIN — ATORVASTATIN CALCIUM 20 MG: 20 TABLET, FILM COATED ORAL at 08:41

## 2023-03-30 RX ADMIN — DOCUSATE SODIUM 100 MG: 100 CAPSULE, LIQUID FILLED ORAL at 08:42

## 2023-03-30 RX ADMIN — OMEGA-3 FATTY ACIDS CAP 1000 MG 1000 MG: 1000 CAP at 08:41

## 2023-03-30 RX ADMIN — PANTOPRAZOLE SODIUM 40 MG: 40 TABLET, DELAYED RELEASE ORAL at 06:40

## 2023-03-30 RX ADMIN — DOCUSATE SODIUM 100 MG: 100 CAPSULE, LIQUID FILLED ORAL at 17:58

## 2023-03-30 RX ADMIN — HEPARIN SODIUM 12 UNITS/KG/HR: 10000 INJECTION, SOLUTION INTRAVENOUS at 14:35

## 2023-03-30 NOTE — UTILIZATION REVIEW
Notification of Unplanned, Urgent, or   Emergency Inpatient Admission   [address] [address] [address] [facility]  Tax ID: [id number]  NPI: [id number]  [facility] Service[facility]  [facility]  Admission Level of Care: Inpatient  Place of Service Code: 24     Attending Physician Information  Attending Name and NPI#: Andres Werner [[id number]]  Phone: [phone]     Admission Information  Inpatient Admission Date/Time: 3/29/23  5:01 PM  Discharge Date/Time: No discharge date for patient encounter  Admitting Diagnosis Code/Description:  Dyspnea [R06 00]  SOB (shortness of breath) [R06 02]  Tachycardia [R00 0]  Elevated troponin [R77 8]  Hypoxemia requiring supplemental oxygen [R09 02, Z99 81]  Bilateral pulmonary embolism ([facility] [address] ) [I26 99]     Utilization Review Contact  Marcos Sweet Utilization   Phone: [phone]  Fax: [phone]  Email: [email or web address]  Contact for approvals/pending authorizations, clinical reviews, and discharge  Physician Advisory Services Contact  Medical Necessity Denial & Ljwo-ph-[id number] Discussion  Phone: [phone]  Fax: [phone]  Email: [email or web address]

## 2023-03-30 NOTE — PLAN OF CARE
Problem: Potential for Falls  Goal: Patient will remain free of falls  Description: INTERVENTIONS:  - Educate patient/family on patient safety including physical limitations  - Instruct patient to call for assistance with activity   - Consult OT/PT to assist with strengthening/mobility   - Keep Call bell within reach  - Keep bed low and locked with side rails adjusted as appropriate  - Keep care items and personal belongings within reach  - Initiate and maintain comfort rounds  - Make Fall Risk Sign visible to staff  - Offer Toileting every 2 Hours, in advance of need  - Initiate/Maintain bed alarm  - Obtain necessary fall risk management equipment: bed alarm   - Apply yellow socks and bracelet for high fall risk patients  - Consider moving patient to room near nurses station  Outcome: Progressing     Problem: PAIN - ADULT  Goal: Verbalizes/displays adequate comfort level or baseline comfort level  Description: Interventions:  - Encourage patient to monitor pain and request assistance  - Assess pain using appropriate pain scale  - Administer analgesics based on type and severity of pain and evaluate response  - Implement non-pharmacological measures as appropriate and evaluate response  - Consider cultural and social influences on pain and pain management  - Notify physician/advanced practitioner if interventions unsuccessful or patient reports new pain  Outcome: Progressing     Problem: INFECTION - ADULT  Goal: Absence or prevention of progression during hospitalization  Description: INTERVENTIONS:  - Assess and monitor for signs and symptoms of infection  - Monitor lab/diagnostic results  - Monitor all insertion sites, i e  indwelling lines, tubes, and drains  - Monitor endotracheal if appropriate and nasal secretions for changes in amount and color  - [place] appropriate cooling/warming therapies per order  - Administer medications as ordered  - Instruct and encourage patient and family to use good hand hygiene technique  - Identify and instruct in appropriate isolation precautions for identified infection/condition  Outcome: Progressing  Goal: Absence of fever/infection during neutropenic period  Description: INTERVENTIONS:  - Monitor WBC    Outcome: Progressing     Problem: SAFETY ADULT  Goal: Patient will remain free of falls  Description: INTERVENTIONS:  - Educate patient/family on patient safety including physical limitations  - Instruct patient to call for assistance with activity   - Consult OT/PT to assist with strengthening/mobility   - Keep Call bell within reach  - Keep bed low and locked with side rails adjusted as appropriate  - Keep care items and personal belongings within reach  - Initiate and maintain comfort rounds  - Make Fall Risk Sign visible to staff  - Offer Toileting every 2 Hours, in advance of need  - Initiate/Maintain bed alarm  - Obtain necessary fall risk management equipment: bed alarm   - Apply yellow socks and bracelet for high fall risk patients  - Consider moving patient to room near nurses station  Outcome: Progressing  Goal: Maintain or return to baseline ADL function  Description: INTERVENTIONS:  -  Assess patient's ability to carry out ADLs; assess patient's baseline for ADL function and identify physical deficits which impact ability to perform ADLs (bathing, care of mouth/teeth, toileting, grooming, dressing, etc )  - Assess/evaluate cause of self-care deficits   - Assess range of motion  - Assess patient's mobility; develop plan if impaired  - Assess patient's need for assistive devices and provide as appropriate  - Encourage maximum independence but intervene and supervise when necessary  - Involve family in performance of ADLs  - Assess for home care needs following discharge   - Consider OT consult to assist with ADL evaluation and planning for discharge  - Provide patient education as appropriate  Outcome: Progressing  Goal: Maintains/Returns to pre admission functional level  Description: INTERVENTIONS:  - Perform BMAT or MOVE assessment daily    - Set and communicate daily mobility goal to care team and patient/family/caregiver  - Collaborate with rehabilitation services on mobility goals if consulted  - Perform Range of Motion 4 times a day  - Reposition patient every 2 hours  - Dangle patient 4 times a day  - Stand patient 4 times a day  - Ambulate patient 4 times a day  - Out of bed to chair 4 times a day   - Out of bed for meals 4 times a day  - Out of bed for toileting  - Record patient progress and toleration of activity level   Outcome: Progressing     Problem: DISCHARGE PLANNING  Goal: Discharge to home or other facility with appropriate resources  Description: INTERVENTIONS:  - Identify barriers to discharge w/patient and caregiver  - Arrange for needed discharge resources and transportation as appropriate  - Identify discharge learning needs (meds, wound care, etc )  - Arrange for interpretive services to assist at discharge as needed  - Refer to Case Management Department for coordinating discharge planning if the patient needs post-hospital services based on physician/advanced practitioner order or complex needs related to functional status, cognitive ability, or social support system  Outcome: Progressing     Problem: Knowledge Deficit  Goal: Patient/family/caregiver demonstrates understanding of disease process, treatment plan, medications, and discharge instructions  Description: Complete learning assessment and assess knowledge base    Interventions:  - Provide teaching at level of understanding  - Provide teaching via preferred learning methods  Outcome: Progressing     Problem: CARDIOVASCULAR - ADULT  Goal: Maintains optimal cardiac output and hemodynamic stability  Description: INTERVENTIONS:  - Monitor I/O, vital signs and rhythm  - Monitor for S/S and trends of decreased cardiac output  - Administer and titrate ordered vasoactive medications to optimize hemodynamic stability  - Assess quality of pulses, skin color and temperature  - Assess for signs of decreased coronary artery perfusion  - Instruct patient to report change in severity of symptoms  Outcome: Progressing  Goal: Absence of cardiac dysrhythmias or at baseline rhythm  Description: INTERVENTIONS:  - Continuous cardiac monitoring, vital signs, obtain 12 lead EKG if ordered  - Administer antiarrhythmic and heart rate control medications as ordered  - Monitor electrolytes and administer replacement therapy as ordered  Outcome: Progressing     Problem: RESPIRATORY - ADULT  Goal: Achieves optimal ventilation and oxygenation  Description: INTERVENTIONS:  - Assess for changes in respiratory status  - Assess for changes in mentation and behavior  - Position to facilitate oxygenation and minimize respiratory effort  - Oxygen administered by appropriate delivery if ordered  - Initiate smoking cessation education as indicated  - Encourage broncho-pulmonary hygiene including cough, deep breathe, Incentive Spirometry  - Assess the need for suctioning and aspirate as needed  - Assess and instruct to report SOB or any respiratory difficulty  - Respiratory Therapy support as indicated  Outcome: Progressing

## 2023-03-30 NOTE — PLAN OF CARE
Problem: PHYSICAL THERAPY ADULT  Goal: Performs mobility at highest level of function for planned discharge setting  See evaluation for individualized goals  Description: Treatment/Interventions: Functional transfer training, LE strengthening/ROM, Elevations, Therapeutic exercise, Endurance training, Patient/family training, Equipment eval/education, Bed mobility, Gait training, Compensatory technique education, Continued evaluation, Spoke to nursing, Family  Equipment Recommended: Other (Comment), Walker (monitor- may benefit from trial of rollator walker)       See flowsheet documentation for full assessment, interventions and recommendations  Outcome: Progressing  Note: Prognosis: Good  Problem List: Decreased strength, Decreased endurance, Decreased mobility, Impaired balance  Assessment: Solomon Alves is a 80 y o  male BPH, GERD, hyperlipidemia, hypertension, hx of COVID one month ago who presents with worsening shortness of breath and dyspnea on exertion X 3 weeks  Pt noted to have highly elevated D-dimer; CTA showed bilateral pulmonary emboli with right heart strain  Heparin initiated  SIRS +  PT consulted  Up and OOB as tolerated orders  Prior to admission had been staying with daughter in which can have first floor set up  Ambulating without AD use, however has RW and cane if needed  Normally lives alone in [facility] home in [address]  Denies falls  Reports since COVID decline in activity tolerance  Currently presents with functional limitations related to decreased activity tolerance, balance, general strength, functional mobility and locomotion requiring RW support  Tires with ambulation and desaturates to 87% with short distances on RA    Seated rest required to recover to 93%  S for bed mobility, transfers and ambulation with RW support  Returned to supine at completion of session for bedside ultrasound  The patient's AM-PAC Basic Mobility Inpatient Short Form Raw Score is 22   A Raw score of greater than 16 suggests the patient may benefit from discharge to home  Please also refer to the recommendation of the Physical Therapist for safe discharge planning  Anticipate ability to return home with family support  Does have RW, however will trial rollator next session to assist with energy conservation/pacing with ambulation  PT will follow and progress per POC  PT Discharge Recommendation: Home with outpatient rehabilitation    See flowsheet documentation for full assessment

## 2023-03-30 NOTE — PROGRESS NOTES
"[address] [facility]  Progress Note  Name: Ministerio Escobar  MRN: [id number]  Unit/Bed#: E4 -01 I Date of Admission: 3/29/2023   Date of Service: 3/30/2023 I Hospital Day: 1    Assessment/Plan   * Acute pulmonary embolism without acute cor pulmonale (HCC)  Assessment & Plan  · Presents with several weeks of worsening shortness of breath; recent diagnosis of COVID 1 month ago  Tachycardic, tachypneic, with slight increase in white blood count; sepsis alert called  However, subsequently found to have D-dimer > 20 00;     · CTA chest: \"Bilateral acute occlusive pulmonary emboli extending from the distal lobar pulmonary arteries into the segmental and subsegmental branches  The calculated ratio of right ventricular to left ventricular diameter (RV/LV ratio) is 1 4 This is greater than 0 9, which is abnormal and indicates right heart strain  An abnormal RV/LV ratio has been shown to be associated with an increased risk of 30 day mortality in the setting of acute pulmonary embolism  \"    · Procalcitonin negative and no further need for antibiotics as this is a PE  · Currently on heparin drip  · Thrombosis panel drawn and is pending  · Initially required 2 L oxygen nasal cannula however now on room air  · Likely provoked in the setting of acute illness given recent COVID diagnosis and patient's inactivity at that time  · Sent prescription to pharmacy to price check Eliquis  · Lower extremity venous duplex pending  · Echocardiogram pending  · Pulmonary consulted    Elevated troponin  Assessment & Plan  · Troponins noted to be elevated in the ED; 50--63--63  · EKG with some T wave abnormality; but no other acute ischemic findings  · Patient denies chest pain  · Troponins likely elevated in the setting of demand ischemia given bilateral Pe's  · Currently on heparin drip for acute PE    Hypertension  Assessment & Plan  · Home regimen metoprolol tartrate 12 5 mg daily  · Blood pressure 153/77, 152/114  · Add " metoprolol 5 mg as needed SBP greater than 160    HLD (hyperlipidemia)  Assessment & Plan  · Continue statin therapy    GERD (gastroesophageal reflux disease)  Assessment & Plan  · Continue PPI therapy    BPH (benign prostatic hyperplasia)  Assessment & Plan  · History of BPH  · No difficulty urinating      VTE Pharmacologic Prophylaxis:   Pharmacologic: Heparin Drip  Mechanical VTE Prophylaxis in Place: Yes    Discharge Plan: With need for continued inpatient stay for heparin drip, eventual transition to oral anticoagulant once checked with insurance  Awaiting PT OT eval    Discussions with Specialists or Other Care Team Provider: Nursing, Dr Ranjith Pratt    Education and Discussions with Family / Patient:, Daughter Malina Joyner at bedside    Time Spent for Care: 45 minutes  More than 50% of total time spent on counseling and coordination of care as described above  Current Length of Stay: 1 day(s)  Current Patient Status: Inpatient   Code Status: Level 1 - Full Code    Subjective:   Patient sitting in chair at bedside  He denies any chest pain or current shortness of breath  He does get mildly winded with any type of exertion or bending over and tying his shoes  Initially required oxygen but is stable on room air at present  Spoke with daughter at bedside and current need for anticoagulation  Continue she reports he normally lives in [facility] by himself however will be staying with her more often especially in the setting of his recent diagnosis of blood clots  Objective:     Vitals:   Temp (24hrs), Av 6 °F (36 4 °C), Min:97 4 °F (36 3 °C), Max:97 8 °F (36 6 °C)    Temp:  [97 4 °F (36 3 °C)-97 8 °F (36 6 °C)] 97 6 °F (36 4 °C)  HR:  [] 91  Resp:  [18-26] 20  BP: (133-153)/() 152/114  SpO2:  [85 %-97 %] 93 %  Body mass index is 32 28 kg/m²  Input and Output Summary (last 24 hours):        Intake/Output Summary (Last 24 hours) at 3/30/2023 1120  Last data filed at 3/29/2023 1755  Gross per 24 hour Intake 500 ml   Output --   Net 500 ml       Physical Exam:     Physical Exam  Vitals and nursing note reviewed  Constitutional:       General: He is not in acute distress  Appearance: Normal appearance  He is normal weight  He is not ill-appearing, toxic-appearing or diaphoretic  HENT:      Head: Normocephalic and atraumatic  Eyes:      General: No scleral icterus  Cardiovascular:      Rate and Rhythm: Normal rate and regular rhythm  Pulmonary:      Effort: Pulmonary effort is normal  No respiratory distress  Breath sounds: Normal breath sounds  No stridor  No wheezing or rhonchi  Comments: On room air  Abdominal:      General: Bowel sounds are normal  There is no distension  Palpations: Abdomen is soft  There is no mass  Tenderness: There is no abdominal tenderness  Hernia: No hernia is present  Musculoskeletal:         General: No swelling  Cervical back: Neck supple  Skin:     General: Skin is warm and dry  Neurological:      Mental Status: He is alert and oriented to person, place, and time  Mental status is at baseline  Psychiatric:         Mood and Affect: Mood normal          Behavior: Behavior normal          Additional Data:     Labs:    Results from last 7 days   Lab Units 03/30/23  0500   WBC Thousand/uL 10 65*   HEMOGLOBIN g/dL 14 5   HEMATOCRIT % 44 1   PLATELETS Thousands/uL 175   NEUTROS PCT % 53   LYMPHS PCT % 34   MONOS PCT % 9   EOS PCT % 2     Results from last 7 days   Lab Units 03/30/23  0500   POTASSIUM mmol/L 4 2   CHLORIDE mmol/L 112*   CO2 mmol/L 22   BUN mg/dL 19   CREATININE mg/dL 1 03   CALCIUM mg/dL 9 1   ALK PHOS U/L 81   ALT U/L 33   AST U/L 24     Results from last 7 days   Lab Units 03/29/23  1234   INR  1 17       * I Have Reviewed All Lab Data Listed Above  * Additional Pertinent Lab Tests Reviewed:  All Labs For Current Hospital Admission Reviewed    Imaging:    Imaging Reports Reviewed Today Include:   Imaging Personally Reviewed by Myself Includes:      Recent Cultures (last 7 days):     Results from last 7 days   Lab Units 03/29/23  1244 03/29/23  1234   BLOOD CULTURE  Received in Microbiology Lab  Culture in Progress  Received in Microbiology Lab  Culture in Progress  Last 24 Hours Medication List:   Current Facility-Administered Medications   Medication Dose Route Frequency Provider Last Rate   • acetaminophen  650 mg Oral Q6H PRN Shawn Lord MD     • aspirin  81 mg Oral Daily Shawn Lord MD     • atorvastatin  20 mg Oral Daily Shawn Lord MD     • cholecalciferol  1,000 Units Oral Daily Shawn Lord MD     • docusate sodium  100 mg Oral BID Shawn Lord MD     • fish oil  1,000 mg Oral Daily Shawn Lord MD     • heparin (porcine)  3-30 Units/kg/hr (Order-Specific) Intravenous Titrated Shawn Lord MD 12 Units/kg/hr (03/30/23 0800)   • heparin (porcine)  3,400 Units Intravenous Q6H PRN Shawn Lord MD     • heparin (porcine)  6,800 Units Intravenous Q6H PRN Shawn Lord MD     • metoprolol  5 mg Intravenous Q6H PRN Catherine Hoyos PA-C     • metoprolol tartrate  12 5 mg Oral Daily Shawn Lord MD     • ondansetron  4 mg Intravenous Q6H PRN Shawn Lord MD     • pantoprazole  40 mg Oral Early Morning Shawn Lord MD          Today, Patient Was Seen By: Catherine Hoyos PA-C    ** Please Note: This note has been constructed using a voice recognition system   **

## 2023-03-30 NOTE — OCCUPATIONAL THERAPY NOTE
Occupational Therapy Evaluation     Patient Name: Debbie SIMPSON Date: 3/30/2023  Problem List  Principal Problem:    Acute pulmonary embolism without acute cor pulmonale (HCC)  Active Problems:    BPH (benign prostatic hyperplasia)    GERD (gastroesophageal reflux disease)    HLD (hyperlipidemia)    Hypertension    Elevated troponin    Past Medical History  Past Medical History:   Diagnosis Date    BPH (benign prostatic hyperplasia)     GERD (gastroesophageal reflux disease)     HLD (hyperlipidemia)     Hypertension     Nuclear sclerosis of right eye      Past Surgical History  Past Surgical History:   Procedure Laterality Date    CARPAL TUNNEL RELEASE Left     COLONOSCOPY      EYE SURGERY Left     KNEE SURGERY Left     TRANSURETHRAL RESECTION OF PROSTATE             03/30/23 0857   OT Last Visit   OT Visit Date 03/30/23   Note Type   Note type Evaluation   Pain Assessment   Pain Assessment Tool 0-10   Pain Score No Pain   Restrictions/Precautions   Weight Bearing Precautions Per Order No   Other Precautions Fall Risk;Telemetry;Multiple lines;Visual impairment   Home Living   Type of Home House   Home Layout Multi-level;Bed/bath upstairs;Stairs to enter with rails  (3+5 CHASIDY)   Bathroom Shower/Tub Walk-in shower   Bathroom Toilet Raised   Bathroom Equipment Grab bars in shower; Shower chair;Grab bars around toilet  (no use of shower chair PTA)   [address] Walker;Cane   Additional Comments Pt lives alone in a multi-level house with 3+5 CHASIDY and FOS to 2nd floor bed/bath, however pt will be D/Cing to dtr's home  Dtr lives in a bi-level house with 0 CHASIDY and 1st floor bed/bath  Pt has 4+5 steps to 2nd floor kitchen at dtr's home  (-) home alone at dtr's  Prior Function   Level of [place] Independent with ADLs; Independent with functional mobility; Independent with IADLS   Lives With Alone;Daughter  (Currently staying with dtr)   Receives Help From Family   IADLs Independent with driving; Independent with meal prep; Independent with medication management  (Independent when living at home, dtr assists when staying w/ dtr)   Falls in the last 6 months 0   Vocational Retired   Comments At baseline, pt was I w/ ADLs, IADLs, and functional transfers/mobility w/o use of AD  (+)   Denies falls PTA  Lifestyle   Autonomy At baseline, pt was I w/ ADLs, IADLs, and functional transfers/mobility w/o use of AD  (+)   Denies falls PTA  Reciprocal Relationships 4 dtrs   Service to Others Retired   ADL   Where Assessed Chair   Eating Assistance 7  [facility] 5  [address] [facility] 5  Supervision/Setup   LB [address]  [address]  5  Supervision/Setup    [facility] 5  [address]  5  [phone] [facility] 5  Supervision/Setup   Bed Mobility   Supine to Sit Unable to assess   Sit to Supine Unable to assess   Additional Comments Pt seated OOB in chair at end of session  Call bell and phone within reach  All needs met and pt reports no further questions for OT at this time  Transfers   Sit to Stand 5  Supervision   Additional items Increased time required;Verbal cues   Stand to Sit 5  Supervision   Additional items Armrests; Increased time required;Verbal cues   Functional Mobility   Functional Mobility 5  Supervision   Additional Comments Assist x1 w/o use of AD; SpO2 post-mobility: 86-87% on RA, HR: 120 bpm  Vitals improved to 90-94% on RA with seated rest break and education on compensatory/pursed lip breathing techniques   Balance   Static Sitting Good   Dynamic Sitting Fair +   Static Standing Fair   Dynamic Standing Fair -   Ambulatory Fair -   Activity Tolerance   Activity Tolerance Patient limited by fatigue;Treatment limited secondary to medical complications (Comment)   Medical Staff Made Aware Tasha RN; Lorelei Stout, PT   Nurse Made Aware yes   RUE Assessment   RUE Assessment WFL  (4/5 throughout)   LUE Assessment   LUE Assessment WFL  (4/5 throughout)   Hand Function   Gross Motor Coordination Functional   Fine Motor Coordination Functional   Sensation   Light Touch No apparent deficits   Proprioception   Proprioception No apparent deficits   Vision-Basic Assessment   Current Vision Wears glasses only for reading   Vision - Complex Assessment   Ocular Range of Motion Intact   Acuity Able to read clock/calendar on wall without difficulty; Able to read newspaper without difficulty   Additional Comments 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   Psychosocial   Psychosocial (WDL) WDL   Perception   Inattention/Neglect Appears intact   Cognition   Overall Cognitive Status [facility]   Arousal/Participation Alert; Cooperative   Attention Within functional limits   Orientation Level Oriented X4   Memory Within functional limits   Following Commands Follows all commands and directions without difficulty   Assessment   Limitation Decreased ADL status; Decreased UE strength;Decreased endurance;Decreased self-care trans;Decreased high-level ADLs   Prognosis Good   Assessment Pt is a 80 y o  male seen for OT evaluation s/p adm to [address]  on 3/29/2023 w/ Acute pulmonary embolism without acute cor pulmonale and elevated troponin  Comorbidities affecting pt’s functional performance include a significant PMH of BPH, HLD, HTN, Nuclear sclerosis of R eye  Pt with active OT orders and activity orders for Up and OOB as tolerated   Pt lives alone in a multi-level house with 3+5 CHASIDY and FOS to 2nd floor bed/bath, however pt will be D/Cing to dtr's home  Dtr lives in a bi-level house with 0 CHASIDY and 1st floor bed/bath  Pt has 4+5 steps to 2nd floor kitchen at dtr's home  (-) home alone at dtr's  At baseline, pt was I w/ ADLs, IADLs, and functional transfers/mobility w/o use of AD  (+)   Denies falls PTA   Upon evaluation, pt currently requires Supervision for UB ADLs, Supervision for LB ADLs, Supervision for toileting, and Supervision for functional mobility/transfers 2* the following deficits impacting occupational performance: SOB, 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), decreased strength, decreased balance, decreased tolerance and decreased safety awareness  These impairments, as well at pt’s fall risk, multiple lines, new O2 requirements, steps to enter environment, limited home support, difficulty performing ADLS and difficulty performing IADLS  limit pt’s ability to safely engage in all baseline areas of occupation  Based on the aforementioned OT evaluation, functional performance deficits, and assessments, pt has been identified as a Moderate complexity evaluation  Pt to continue to benefit from continued acute OT services during hospital stay to address defined deficits and to maximize level of functional independence in the following Occupational Performance areas: grooming, bathing/shower, toilet hygiene, dressing, medication management, health maintenance, functional mobility, community mobility, clothing management, cleaning, meal prep and household maintenance  From OT standpoint, recommend Home w/ OPPT upon D/C  OT will continue to follow pt 1-3x/wk to address the following goals to  w/in 10-14 days:   Goals   Patient Goals To feel better   LTG Time Frame 10-14   Long Term Goal Please refer to LTGs listed below   Plan   Treatment Interventions ADL retraining;Functional transfer training;UE strengthening/ROM; Endurance training;Patient/family training;Equipment evaluation/education; Compensatory technique education;Continued evaluation; Energy conservation; Activityengagement   Goal Expiration Date 23   OT Treatment Day 0   OT Frequency Other (comment)  (1-3x/wk)   Recommendation   OT Discharge Recommendation Home with outpatient rehabilitation  (OPPT)   Additional Comments  The patient's raw score on the AM-PAC Daily Activity Inpatient Short Form is 19  A raw score of greater than or equal to 19 suggests the patient may benefit from discharge to home  Please refer to the recommendation of the Occupational Therapist for safe discharge planning     AM-PAC Daily Activity Inpatient   Lower Body Dressing 3   Bathing 3   Toileting 3   Upper Body Dressing 3   Grooming 3   Eating 4   Daily Activity Raw Score 19   Daily Activity Standardized Score (Calc for Raw Score >=11) 40 22   AM-PAC Applied Cognition Inpatient   Following a Speech/Presentation 4   Understanding Ordinary Conversation 4   Taking Medications 4   Remembering Where Things Are Placed or Put Away 4   Remembering List of 4-5 Errands 4   Taking Care of Complicated Tasks 4   Applied Cognition Raw Score 24   Applied Cognition Standardized Score 62 21        GOALS    Pt will improve activity tolerance to G for min 30 min txment sessions for increase engagement in functional tasks    Pt will complete bed mobility at a Mod I level w/ G balance/safety demonstrated to decrease caregiver assistance required     Pt will complete UB dressing/self care w/ mod I using adaptive device and DME as needed     Pt will complete LB dressing/self care w/ mod I using adaptive device and DME as needed    Pt will complete toileting w/ mod I w/ G hygiene/thoroughness using DME as needed    Pt will improve functional transfers to Mod I on/off all surfaces using DME as needed w/ G balance/safety     Pt will improve functional mobility during ADL/IADL/leisure tasks to Mod I using DME as needed w/ G balance/safety     Pt will be attentive 100% of the time during ongoing cognitive assessment w/ G participation to assist w/ safe d/c planning/recommendations    Pt will demonstrate G carryover of pt/caregiver education and training as appropriate w/o cues w/ good tolerance to increase safety during functional tasks    Pt will demonstrate 100% carryover of energy conservation techniques t/o functional I/ADL/leisure tasks w/o cues s/p skilled education to increase endurance during functional tasks    Pt will increase BUE strength by 1MM grade via AROM exercises to increase independence in ADLs and transfers    Pt will verbalize 3 potential fall hazards and identify appropriate compensatory techniques to decrease fall risk in home environment     Pt will increase standing tolerance to 8-10 mins with Fair+ dynamic standing balance to increase safety during participation in ADLs       Renu Schwartz, OTR/L

## 2023-03-30 NOTE — CONSULTS
"Consultation - Pulmonary Medicine   Debbie Mathis 80 y o  male MRN: [id number]  Unit/Bed#: E4 -01 Encounter: [id number]      Assessment/Plan:    1  Acute hypoxic respiratory failure likely secondary to acute PE        -Currently on 2L-94%, does not wear home O2        -Continued saturations greater than 88%        -Pulmonary toileting: Deep breathing cough, OOB as tolerated, IS Q 1 hr        -We will need ambulatory pulse ox prior to discharge    2  Acute B/L submassive occlusive likely provoked PEs w/ reported RV strain per CT scan        -B/L occlusive PE from distal lower PA into segmental subsegmental branches        -Likely secondary to recent COVID-19 infection- 2/8/2023        -We will wait for echo to assess RV function        -No indication at this point to initiate thrombolytics/embolectomy        -Recommend minimum 6 months of therapy        -Continue heparin GTT for now-we will await echo to initiate Eliquis        -Lower extremity Doppler ordered      3  H/O COVID-19- 2/8/2023       -Patient completed course of Paxlovid       -No indication for any further treatment      History of Present Illness   Physician Requesting Consult: Jessica De La Torre DO  Reason for Consult / Principal Problem: Acute PE  Hx and PE limited by: Nothing  Chief Complaint: \" I was feeling short of breath\"  HPI: Debbie Mathis is a 80 y o   male who presented to [address] [place] [address] with complaints of shortness of breath  Patient has past medical history of COVID, BPH, hypertension and hyperlipidemia  Patient reports that he went to go visit his daughter when he was noted to be increasingly shortness of breath especially when walking up the stairs and ambulating  Patient reports some intermittent lightheadedness  Upon ED admission patient was noted to be hypoxic at 85%  Patient was noted to have bilateral occlusive PEs  Patient was initiated on heparin gtt  Pulmonary was consulted for bilateral PEs    Today upon " examination patient reports that his shortness of breath has significantly improved  Patient reports that he does still feel some dyspnea upon minimal exertion  Patient currently denying any fevers, chills, abscess, headaches, night sweats, pleuritic chest pain, or palpations  On pulmonary standpoint, patient does not follow with a pulmonologist   Patient has been a lifelong non-smoker and has never been diagnosed with COPD or asthma  Patient is currently not maintained on any inhaled or oxygen therapies  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  Patient denies having any pets  Patient reports history of GERD in which she is maintained on Protonix  Patient denies any symptoms of WEST, seasonal allergies, or postnasal drip  Patient currently denies any recent acute exposures to dust, mold, spices, or silica  Consults    Review of Systems   Constitutional: Negative for chills and fever  HENT: Negative for ear pain and sore throat  Eyes: Negative for pain and visual disturbance  Respiratory: Positive for shortness of breath  Negative for apnea, cough, choking, chest tightness, wheezing and stridor  Cardiovascular: Negative for chest pain and palpitations  Gastrointestinal: Negative for abdominal pain and vomiting  Genitourinary: Negative for dysuria and hematuria  Musculoskeletal: Negative for arthralgias and back pain  Skin: Negative for color change and rash  Neurological: Negative for seizures and syncope  Psychiatric/Behavioral: Negative for agitation and behavioral problems  All other systems reviewed and are negative        Historical Information   Past Medical History:   Diagnosis Date   • BPH (benign prostatic hyperplasia)    • GERD (gastroesophageal reflux disease)    • HLD (hyperlipidemia)    • Hypertension    • Nuclear sclerosis of right eye      Past Surgical History:   Procedure Laterality Date   • CARPAL TUNNEL RELEASE Left    • "COLONOSCOPY     • EYE SURGERY Left    • KNEE SURGERY Left    • TRANSURETHRAL RESECTION OF PROSTATE       Social History   Social History     Substance and Sexual Activity   Alcohol Use Never     Social History     Substance and Sexual Activity   Drug Use Not Currently     Social History     Tobacco Use   Smoking Status Never   Smokeless Tobacco Never     E-Cigarette/Vaping   • E-Cigarette Use Never User      E-Cigarette/Vaping Substances     Occupational History: [employer]    Family History: History reviewed  No pertinent family history  Meds/Allergies   pertinent pulmonary meds have been reviewed    Allergies   Allergen Reactions   • Cefaclor Other (See Comments)   • Ibuprofen Other (See Comments)   • Cephalosporins Rash   • Penicillins Rash   • Sulfa Antibiotics Other (See Comments) and Rash     Rash - takes flomax         Objective   Vitals: Blood pressure (!) 152/114, pulse 91, temperature 97 6 °F (36 4 °C), temperature source Temporal, resp  rate 20, height 5' 5\" (1 651 m), weight 88 kg (194 lb 0 1 oz), SpO2 93 %  ra,Body mass index is 32 28 kg/m²  Intake/Output Summary (Last 24 hours) at 3/30/2023 1122  Last data filed at 3/29/2023 1755  Gross per 24 hour   Intake 500 ml   Output --   Net 500 ml     Invasive Devices     Peripheral Intravenous Line  Duration           Peripheral IV 03/29/23 Distal;Right;Upper;Ventral (anterior) Arm <1 day    Peripheral IV 03/29/23 Left Antecubital <1 day                Physical Exam  Constitutional:       General: He is not in acute distress  Appearance: Normal appearance  He is normal weight  He is not ill-appearing  HENT:      Head: Normocephalic and atraumatic  Nose: Nose normal  No congestion or rhinorrhea  Mouth/Throat:      Mouth: Mucous membranes are moist       Pharynx: No oropharyngeal exudate or posterior oropharyngeal erythema  Cardiovascular:      Pulses: Normal pulses  Heart sounds: Normal heart sounds  No murmur heard      No friction " rub  No gallop  Pulmonary:      Effort: Pulmonary effort is normal  No tachypnea, bradypnea, accessory muscle usage or respiratory distress  Breath sounds: No stridor, decreased air movement or transmitted upper airway sounds  No decreased breath sounds, wheezing, rhonchi or rales  Comments: Clear breath sounds  Chest:      Chest wall: No tenderness  Abdominal:      General: Abdomen is flat  Bowel sounds are normal  There is no distension  Palpations: Abdomen is soft  Musculoskeletal:         General: No swelling or tenderness  Normal range of motion  Cervical back: Normal range of motion  No rigidity or tenderness  Skin:     General: Skin is warm and dry  Coloration: Skin is not jaundiced or pale  Neurological:      General: No focal deficit present  Mental Status: He is alert and oriented to person, place, and time  Mental status is at baseline     Psychiatric:         Mood and Affect: Mood normal          Behavior: Behavior normal          Lab Results:   I have personally reviewed pertinent lab results CBC:   Lab Results   Component Value Date    WBC 10 65 (H) 03/30/2023    HGB 14 5 03/30/2023    HCT 44 1 03/30/2023    MCV 97 03/30/2023     03/30/2023    MCH 31 8 03/30/2023    MCHC 32 9 03/30/2023    RDW 14 0 03/30/2023    MPV 11 6 03/30/2023    NRBC 0 03/30/2023   , CMP:   Lab Results   Component Value Date    SODIUM 143 03/30/2023    K 4 2 03/30/2023     (H) 03/30/2023    CO2 22 03/30/2023    BUN 19 03/30/2023    CREATININE 1 03 03/30/2023    CALCIUM 9 1 03/30/2023    AST 24 03/30/2023    ALT 33 03/30/2023    ALKPHOS 81 03/30/2023    EGFR 65 03/30/2023   , PT/INR:   Lab Results   Component Value Date    INR 1 17 03/29/2023       Imaging Studies: I have personally reviewed pertinent films in PACS     CTA chest-bilateral acute occlusive PE sending from distal lower pulmonary arteries into segmental subsegmental branches    EKG, Pathology, and Other Studies: I "have personally reviewed pertinent films in PACS     Echo pending    Pulmonary Results (PFTs, PSG): I have personally reviewed pertinent films in PACS     No PFTs recorded    VTE Prophylaxis: Sequential compression device (Venodyne)     Code Status: Level 1 - Full Code    None    Portions of the record may have been created with voice recognition software  Occasional wrong word or \"sound a like\" substitutions may have occurred due to the inherent limitations of voice recognition software  Read the chart carefully and recognize, using context, where substitutions have occurred    "

## 2023-03-30 NOTE — PLAN OF CARE
Problem: OCCUPATIONAL THERAPY ADULT  Goal: Performs self-care activities at highest level of function for planned discharge setting  See evaluation for individualized goals  Description: Treatment Interventions: ADL retraining, Functional transfer training, UE strengthening/ROM, Endurance training, Patient/family training, Equipment evaluation/education, Compensatory technique education, Continued evaluation, Energy conservation, Activityengagement          See flowsheet documentation for full assessment, interventions and recommendations  Note: Limitation: Decreased ADL status, Decreased UE strength, Decreased endurance, Decreased self-care trans, Decreased high-level ADLs  Prognosis: Good  Assessment: Pt is a 80 y o  male seen for OT evaluation s/p adm to [facility] on 3/29/2023 w/ Acute pulmonary embolism without acute cor pulmonale and elevated troponin  Comorbidities affecting pt’s functional performance include a significant PMH of BPH, HLD, HTN, Nuclear sclerosis of R eye  Pt with active OT orders and activity orders for Up and OOB as tolerated   Pt lives alone in a multi-level house with 3+5 CHASIDY and FOS to 2nd floor bed/bath, however pt will be D/Cing to dtr's home  Dtr lives in a bi-level house with 0 CHASIDY and 1st floor bed/bath  Pt has 4+5 steps to 2nd floor kitchen at dtr's home  (-) home alone at r's  At baseline, pt was I w/ ADLs, IADLs, and functional transfers/mobility w/o use of AD  (+)   Denies falls PTA  Upon evaluation, pt currently requires Supervision for UB ADLs, Supervision for LB ADLs, Supervision for toileting, and Supervision for functional mobility/transfers 2* the following deficits impacting occupational performance: SOB, 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), decreased strength, decreased balance, decreased tolerance and decreased safety awareness   These impairments, as well at pt’s fall risk, multiple lines, new O2 requirements, steps to enter environment, limited home support, difficulty performing ADLS and difficulty performing IADLS  limit pt’s ability to safely engage in all baseline areas of occupation  Based on the aforementioned OT evaluation, functional performance deficits, and assessments, pt has been identified as a Moderate complexity evaluation  Pt to continue to benefit from continued acute OT services during hospital stay to address defined deficits and to maximize level of functional independence in the following Occupational Performance areas: grooming, bathing/shower, toilet hygiene, dressing, medication management, health maintenance, functional mobility, community mobility, clothing management, cleaning, meal prep and household maintenance  From OT standpoint, recommend Home w/ OPPT upon D/C   OT will continue to follow pt 1-3x/wk to address the following goals to  w/in 10-14 days:     OT Discharge Recommendation: Home with outpatient rehabilitation (OPPT)

## 2023-03-30 NOTE — ASSESSMENT & PLAN NOTE
"· Presents with several weeks of worsening shortness of breath; recent diagnosis of COVID 1 month ago  Tachycardic, tachypneic, with slight increase in white blood count; sepsis alert called  However, subsequently found to have D-dimer > 20 00;     · CTA chest: \"Bilateral acute occlusive pulmonary emboli extending from the distal lobar pulmonary arteries into the segmental and subsegmental branches  The calculated ratio of right ventricular to left ventricular diameter (RV/LV ratio) is 1 4 This is greater than 0 9, which is abnormal and indicates right heart strain  An abnormal RV/LV ratio has been shown to be associated with an increased risk of 30 day mortality in the setting of acute pulmonary embolism  \"    · Procalcitonin negative and no further need for antibiotics as this is a PE  · Currently on heparin drip  · Thrombosis panel drawn and is pending  · Initially required 2 L oxygen nasal cannula however now on room air  · Likely provoked in the setting of acute illness given recent COVID diagnosis and patient's inactivity at that time  · Sent prescription to pharmacy to price check Eliquis  · Lower extremity venous duplex pending  · Echocardiogram pending  · Pulmonary consulted  "

## 2023-03-30 NOTE — ASSESSMENT & PLAN NOTE
· Home regimen metoprolol tartrate 12 5 mg daily  · Blood pressure 153/77, 152/114  · Add metoprolol 5 mg as needed SBP greater than 160

## 2023-03-30 NOTE — ASSESSMENT & PLAN NOTE
· Troponins noted to be elevated in the ED; 50--63--63  · EKG with some T wave abnormality; but no other acute ischemic findings  · Patient denies chest pain  · Troponins likely elevated in the setting of demand ischemia given bilateral Pe's  · Currently on heparin drip for acute PE

## 2023-03-30 NOTE — PHYSICAL THERAPY NOTE
PT EVALUATION 09:25-09:45    87 y o     [id number]    Dyspnea [R06 00]  SOB (shortness of breath) [R06 02]  Tachycardia [R00 0]  Elevated troponin [R77 8]  Hypoxemia requiring supplemental oxygen [R09 02, Z99 81]  Bilateral pulmonary embolism (HCC) [I26 99]    Past Medical History:   Diagnosis Date    BPH (benign prostatic hyperplasia)     GERD (gastroesophageal reflux disease)     HLD (hyperlipidemia)     Hypertension     Nuclear sclerosis of right eye          Past Surgical History:   Procedure Laterality Date    CARPAL TUNNEL RELEASE Left     COLONOSCOPY      EYE SURGERY Left     KNEE SURGERY Left     TRANSURETHRAL RESECTION OF PROSTATE          03/30/23 0925   PT Last Visit   PT Visit Date 03/30/23   Note Type   Note type Evaluation   Pain Assessment   Pain Score No Pain   Restrictions/Precautions   Weight Bearing Precautions Per Order No   Other Precautions Fall Risk;Telemetry;Multiple lines   Home Living   Type of [address] [place] Walker;Cane  (not using PTA)   Additional Comments Has his home in [place], however local children of whom he is able to stay with   Currently staying with daughter, 0 CHASIDY and first floor set up  Negotiates 4+5 steps to kitchen area if desires  Not home alone with son in law present when daughter works 2 days per week  Prior Function   Level of [place] Independent with functional mobility; Independent with ADLs; Needs assistance with IADLS   Lives With Alone;Daughter  (currently staying with daughter )   [facility] 85 in the last 6 months 0   Vocational Retired   Comments I PTA without AD prior to admission  Admits to increased fatigue and decreased activity tolerance since having COVID  General   Additional Pertinent History Pt is 81 y/o male presents with B/L PE and R heart strain  PT consulted  Up and OOB as tolerated     Family/Caregiver Present Yes  (2 daughters )   Cognition   Overall Cognitive Status WFL   Arousal/Participation Alert   Orientation Level Oriented X4   Following Commands Follows all commands and directions without difficulty   Comments Pleasant  Subjective   Subjective Agreeable to PT  Does admit to fatigue and dizziness  RUE Assessment   RUE Assessment WFL  (as observed with functional reach and grasp )   LUE Assessment   LUE Assessment WFL  (as observed with functional reach and grasp )   RLE Assessment   RLE Assessment WFL   Strength RLE   RLE Overall Strength 4-/5   LLE Assessment   LLE Assessment WFL   Strength LLE   LLE Overall Strength 4-/5   Vision-Basic Assessment   Current Vision Wears glasses all the time   Bed Mobility   Sit to Supine 5  Supervision   Additional items Increased time required;Verbal cues   Additional Comments Resting RA O2 sat 95%   Transfers   Sit to Stand 5  Supervision   Additional items Increased time required;Verbal cues;Armrests   Stand to Sit 5  Supervision   Additional items Increased time required;Verbal cues;Armrests   Additional Comments Cues for hand placement  Increased time to complete  Ambulation/Elevation   Gait pattern Improper Weight shift;Decreased foot clearance; Inconsistent mark; Short stride; Excessively slow   Gait Assistance 5  Supervision   Additional items Verbal cues   Assistive Device Rolling walker   Distance Amb with RW 55'x2 with seated rest   O2 sats p distances 87%,   With rest x 5 minutes improved to 93%  Balance   Static Sitting Good   Dynamic Sitting Fair   Static Standing Fair   Dynamic Standing Fair -   Ambulatory Fair -   Endurance Deficit   Endurance Deficit Yes   Endurance Deficit Description fatigue, BRADLEY  O2 sat on RA wiht ambulation 87%,     Resting RA O2 sat 95%, HR 98   Activity Tolerance   Activity Tolerance Patient limited by fatigue;Treatment limited secondary to medical complications (Comment)   Medical Staff Made Aware Tasha Howe   Nurse Made Aware notified of O2 sat and HR with activity  Assessment   Prognosis Good   Problem List Decreased strength;Decreased endurance;Decreased mobility; Impaired balance   Assessment Moon Tran is a 80 y o  male BPH, GERD, hyperlipidemia, hypertension, hx of COVID one month ago who presents with worsening shortness of breath and dyspnea on exertion X 3 weeks  Pt noted to have highly elevated D-dimer; CTA showed bilateral pulmonary emboli with right heart strain  Heparin initiated  SIRS +  PT consulted  Up and OOB as tolerated orders  Prior to admission had been staying with daughter in which can have first floor set up  Ambulating without AD use, however has RW and cane if needed  Normally lives alone in [facility] in [facility]  Denies falls  Reports since COVID decline in activity tolerance  Currently presents with functional limitations related to decreased activity tolerance, balance, general strength, functional mobility and locomotion requiring RW support  Tires with ambulation and desaturates to 87% with short distances on RA    Seated rest required to recover to 93%  S for bed mobility, transfers and ambulation with RW support  Returned to supine at completion of session for bedside ultrasound  The patient's AM-PAC Basic Mobility Inpatient Short Form Raw Score is 22  A Raw score of greater than 16 suggests the patient may benefit from discharge to home  Please also refer to the recommendation of the Physical Therapist for safe discharge planning  Anticipate ability to return home with family support  Does have RW, however will trial rollator next session to assist with energy conservation/pacing with ambulation  PT will follow and progress per POC  Goals   Patient Goals feel better   [facility] Expiration Date 04/09/23   Short Term Goal #1 10 days: 1)  Pt will perform bed mobility with [place] demonstrating appropriate technique 100% of the time in order to improve function  2)  Perform all transfers with [place] demonstrating safe and appropriate technique 100% of the time in order to improve ability to negotiate safely in home environment  3) Amb with least restrictive AD > 150'x1 with mod I in order to demonstrate ability to negotiate in home environment  4)  Improve overall strength and balance 1/2 grade in order to optimize ability to perform functional tasks and reduce fall risk  5) Increase activity tolerance to 30 minutes in order to improve endurance to functional tasks  6)  Negotiate stairs using most appropriate technique and S in order to be able to negotiate safely in home environment  7) PT for ongoing patient and family/caregiver education, DME needs and d/c planning in order to promote highest level of function in least restrictive environment  Plan   Treatment/Interventions Functional transfer training;LE strengthening/ROM; Elevations; Therapeutic exercise; Endurance training;Patient/family training;Equipment eval/education; Bed mobility;Gait training; Compensatory technique education;Continued evaluation;Spoke to nursing;Family   PT Frequency 3-5x/wk   Recommendation   PT Discharge Recommendation Home with outpatient rehabilitation   Equipment Recommended Other (Comment); Walker  (monitor- may benefit from trial of rollator walker)   Walker Package Recommended Rollator  (trial rollator-monitor)   AM-PAC Basic Mobility Inpatient   Turning in Flat Bed Without Bedrails 4   Lying on Back to Sitting on Edge of Flat Bed Without Bedrails 3   Moving Bed to Chair 4   Standing Up From Chair Using Arms 4   Walk in Room 4   Climb 3-5 Stairs With Railing 3   Basic Mobility Inpatient Raw Score 22   Basic Mobility Standardized Score 47 4   Highest Level Of Mobility   JH-HLM Goal 7: Walk 25 feet or more   JH-HLM Achieved 7: Walk 25 feet or more   End of Consult   Patient Position at End of Consult Supine; All needs within reach   End of Consult Comments US at bedside for testing       Hx/personal factors: co-morbidities, advanced age, mutliple lines, telemetry, use of AD, and fall risk  Examination: dec mobility, dec balance, dec endurance, dec amb, risk for falls, assessed body system, balance, endurance, amb, D/C disposition & fall risk  Clinical: unpredictable (ongoing medical status, abnormal lab values, risk for falls, imaging test/result pending, and consult pending), hypoxia, tachycardia     Complexity: high           Eduar Nails, PT

## 2023-03-30 NOTE — UTILIZATION REVIEW
Initial Clinical Review    Admission: Date/Time/Statement:   Admission Orders (From admission, onward)     Ordered        03/29/23 1700  INPATIENT ADMISSION  Once                      Orders Placed This Encounter   Procedures   • INPATIENT ADMISSION     Standing Status:   Standing     Number of Occurrences:   1     Order Specific Question:   Level of Care     Answer:   Level 2 Stepdown / HOT [14]     Order Specific Question:   Estimated length of stay     Answer:   More than 2 Midnights     Order Specific Question:   Certification     Answer:   I certify that inpatient services are medically necessary for this patient for a duration of greater than two midnights  See H&P and MD Progress Notes for additional information about the patient's course of treatment  ED Arrival Information     Expected   -    Arrival   3/29/2023 11:45    Acuity   Emergent            Means of arrival   Walk-In    Escorted by   Family Member    Service   Hospitalist    Admission type   Emergency            Arrival complaint   sob            Chief Complaint   Patient presents with   • Shortness of Breath     Exertional dyspnea worsening since February after he was diagnosed with Covid  Initial Presentation: 80 y o  male presents to the ED from home with c/o SOB, dyspnea x 3 wks, occasional headaches  PMH: Covid in February w/o return to previous respiratory status, BPH, GERD, HLD, HTN  In the ED he was tachycardic, tachypneic, hypoxic - put on oxygen 4L NC, SIRS +  Labs - elevated troponins, D dimer, lactic acidosis, leukocytosis  Imaging - Bilateral acute occlusive pulmonary emboli extending from the distal lobar pulmonary arteries into the segmental and subsegmental branches with  (RV/LV ratio) is 1 4 - indicative of R heart failure  Treated with ASA, IV fluid bolus 500 cc, IV antibiotics, Heparin bolus and drip  On exam he is tachycardic, normal breath sounds    He is admitted to INPATIENT status to Level 2 SD with Acute PE - Heparin drip, oxygen, trend lactic acid, trend procal, Tele, thrombosis panel, BLE doppler, poss Pulmonary consult  Elevated troponins - Tele, poss cardio consult  Date: 3/30   Day 2:   Improving WBCs  Remains on Heparin drip this AM   Pulm consult - no embolectomy needed  Continue IV Heparin drip and then Eliquis  Pt is off oxygen  Therapy recommending home w/ OP rehab  Venous duplex of BLE was + for bilat DVTs  And will continue on Heparin drip for another 24 hrs  Echo with EF 51% and RV size moderately to severely dilated and there is evidence of Belle sign  Lungs remains clear  3/30  Pulmonary Consult - Acute hypoxic respiratory failure likely secondary to acute PE - on 2L NC oxygen  Not on home oxygen  Continue pulm toilet, pre-d/c home oxygen eval   No need for thrombolytics  Embolectomy  Will continue IV Heparin, waiting for Echo to initiate Eliquis  On exam has clear lung sounds        ED Triage Vitals [03/29/23 1152]   Temperature Pulse Respirations Blood Pressure SpO2   97 8 °F (36 6 °C) (!) 111 (!) 26 151/85 (!) 85 %      Temp Source Heart Rate Source Patient Position - Orthostatic VS BP Location FiO2 (%)   Oral Monitor Sitting Right arm --      Pain Score       No Pain          Wt Readings from Last 1 Encounters:   03/29/23 88 kg (194 lb 0 1 oz)     Additional Vital Signs:   03/30/23 1115 -- 90 -- 152/114 Abnormal  -- --     03/30/23 0700 97 6 °F (36 4 °C) 91 20 152/114 Abnormal  91 93 % -- -- -- Sitting   03/30/23 0241 97 5 °F (36 4 °C) 71 18 140/83 107 96 % 28 2 L/min Nasal cannula Lying   03/29/23 2324 97 4 °F (36 3 °C) Abnormal  72 18 153/77 105 95 % 28 2 L/min Nasal cannula Lying   03/29/23 1832 97 8 °F (36 6 °C) 88 20 141/102 Abnormal  118 91 % -- -- Nasal cannula Sitting   03/29/23 1712 -- 97 20 133/79 -- 97 % 28 2 L/min Nasal cannula Lying   03/29/23 1559 97 7 °F (36 5 °C) 87 21 147/89 114 95 % -- -- Nasal cannula Lying   03/29/23 1400 -- 75 24 Abnormal  144/79 106 97 % 28 2 L/min -- --   03/29/23 1311 -- 81 22 133/75 99 97 % 28 2 L/min Nasal cannula --   03/29/23 1259 -- -- -- -- -- -- -- -- Nasal cannula --   03/29/23 1203 -- 101 26 Abnormal  -- -- 96 % 36 4 L/min Nasal cannula --     Pertinent Labs/Diagnostic Test Results:     3/29 ECG - Sinus rhythm with marked sinus arrhythmia with occasional Premature ventricular complexes  Rightward axis  Low voltage QRS  T wave abnormality, consider anterior ischemia  Prolonged QT  Abnormal ECG  3/29 ECG - Sinus rhythm with Fusion complexes  Rightward axis  Low voltage QRS  T wave abnormality, consider inferior ischemia  T wave abnormality, consider anterolateral ischemia  Abnormal ECG    3/29 Echo - •  Left Ventricle: Left ventricular cavity size is normal  Wall thickness is normal  The left ventricular ejection fraction is 51% by biplane measurement  Systolic function is low normal  Although no diagnostic regional wall motion abnormality was identified, this possibility cannot be completely excluded on the basis of this study  •  Right Ventricle: Right ventricular cavity size is moderate to severely dilated  Systolic function is moderately reduced  There is a slight Belle sign indicative of pulmonary embolization  •  Left Atrium: The atrium is mildly dilated  •  Right Atrium: The atrium is mildly dilated  •  Aortic Valve: The aortic valve is trileaflet  There is aortic sclerosis  The aortic valve has no significant stenosis  The aortic valve peak velocity is 1 28 m/s  The aortic valve mean gradient is 3 mmHg  The dimensionless velocity index is 0 73  The aortic valve area is 2 30 cm2  The stroke volume index is 28 20 ml/m2  •  Tricuspid Valve: The right ventricular systolic pressure is moderately elevated  The estimated right ventricular systolic pressure is 61 66 mmHg  •  Aorta: The aortic root is normal in size  The ascending aorta is mildly dilated    •  Pulmonary Artery: The estimated pulmonary artery systolic pressure is 52 0 mmHg  The pulmonary artery systolic pressure is moderately increased  CTA ED chest PE study      Bilateral acute occlusive pulmonary emboli extending from the distal lobar pulmonary arteries into the segmental and subsegmental branches  The calculated ratio of right ventricular to left ventricular diameter (RV/LV ratio) is 1 4      This is greater than 0 9, which is abnormal and indicates right heart strain  An abnormal RV/LV ratio has been shown to be associated with an increased risk of 30 day mortality in the setting of acute pulmonary embolism  VAS lower limb venous duplex study, complete bilateral       RIGHT LOWER LIMB:  Acute deep vein thrombosis is noted in the popliteal vein and posterior tibial veins  No evidence of superficial thrombophlebitis noted  No evidence of valvular incompetence noted in the deep veins  Popliteal, posterior tibial and anterior tibial arterial Doppler waveforms are biphasic  LEFT LOWER LIMB:  Acute deep vein thrombosis sis noted in the gastrocnemius veins  No evidence of superficial thrombophlebitis noted  No evidence of valvular incompetence noted in the deep veins  Popliteal, posterior tibial and anterior tibial arterial Doppler waveforms are biphasic       Results from last 7 days   Lab Units 03/29/23  1239   SARS-COV-2  Negative     Results from last 7 days   Lab Units 03/30/23  0500 03/29/23  1234   WBC Thousand/uL 10 65* 12 10*   HEMOGLOBIN g/dL 14 5 15 1   HEMATOCRIT % 44 1 44 7   PLATELETS Thousands/uL 175 172   NEUTROS ABS Thousands/µL 5 67 8 12*         Results from last 7 days   Lab Units 03/30/23  0500 03/29/23  1234   SODIUM mmol/L 143 142   POTASSIUM mmol/L 4 2 4 4   CHLORIDE mmol/L 112* 110*   CO2 mmol/L 22 23   ANION GAP mmol/L 9 9   BUN mg/dL 19 24   CREATININE mg/dL 1 03 1 08   EGFR ml/min/1 73sq m 65 61   CALCIUM mg/dL 9 1 9 5   MAGNESIUM mg/dL 2 1  --    PHOSPHORUS mg/dL 3 1  --      Results from last 7 days   Lab Units 03/30/23  0500 03/29/23  1234   AST U/L 24 32   ALT U/L 33 36   ALK PHOS U/L 81 85   TOTAL PROTEIN g/dL 6 7 7 2   ALBUMIN g/dL 4 0 4 2   TOTAL BILIRUBIN mg/dL 0 82 0 79         Results from last 7 days   Lab Units 03/30/23  0500 03/29/23  1234   GLUCOSE RANDOM mg/dL 105 86     Results from last 7 days   Lab Units 03/29/23  1703 03/29/23  1531 03/29/23  1234   HS TNI 0HR ng/L  --   --  50*   HS TNI 2HR ng/L  --  63*  --    HSTNI D2 ng/L  --  13  --    HS TNI 4HR ng/L 63*  --   --    HSTNI D4 ng/L 13  --   --      Results from last 7 days   Lab Units 03/29/23  1234   D-DIMER QUANTITATIVE ug/ml FEU >20 00*     Results from last 7 days   Lab Units 03/30/23  0500 03/29/23  2211 03/29/23  1234   PROTIME seconds  --   --  14 9*   INR   --   --  1 17   PTT seconds 130* 202* 30         Results from last 7 days   Lab Units 03/30/23  0500 03/29/23  1234   PROCALCITONIN ng/ml 0 06 0 05     Results from last 7 days   Lab Units 03/29/23  1531 03/29/23  1234   LACTIC ACID mmol/L 1 5 2 5*     Results from last 7 days   Lab Units 03/29/23  1239   INFLUENZA A PCR  Negative   INFLUENZA B PCR  Negative   RSV PCR  Negative           Results from last 7 days   Lab Units 03/29/23  1244 03/29/23  1234   BLOOD CULTURE  Received in Microbiology Lab  Culture in Progress  Received in Microbiology Lab  Culture in Progress                 ED Treatment:   Medication Administration from 03/29/2023 1145 to 03/29/2023 1813       Date/Time Order Dose Route Action     03/29/2023 1346 EDT aspirin chewable tablet 243 mg 243 mg Oral Given     03/29/2023 1348 EDT sodium chloride 0 9 % bolus 500 mL 500 mL Intravenous New Bag     03/29/2023 1354 EDT levofloxacin (LEVAQUIN) IVPB (premix in dextrose) 750 mg 150 mL 750 mg Intravenous New Bag     03/29/2023 1451 EDT iohexol (OMNIPAQUE) 350 MG/ML injection (SINGLE-DOSE) 100 mL 100 mL Intravenous Given     03/29/2023 1646 EDT heparin (porcine) injection 6,800 Units 6,800 Units Intravenous Given     03/29/2023 1652 EDT heparin (porcine) 25,000 units in 0 45% NaCl 250 mL infusion (premix) 18 Units/kg/hr Intravenous New Bag        Past Medical History:   Diagnosis Date   • BPH (benign prostatic hyperplasia)    • GERD (gastroesophageal reflux disease)    • HLD (hyperlipidemia)    • Hypertension    • Nuclear sclerosis of right eye      Present on Admission:  • BPH (benign prostatic hyperplasia)  • GERD (gastroesophageal reflux disease)  • HLD (hyperlipidemia)  • Hypertension  • Acute pulmonary embolism without acute cor pulmonale (HCC)  • Elevated troponin      Admitting Diagnosis: Dyspnea [R06 00]  SOB (shortness of breath) [R06 02]  Tachycardia [R00 0]  Elevated troponin [R77 8]  Hypoxemia requiring supplemental oxygen [R09 02, Z99 81]  Bilateral pulmonary embolism (HCC) [I26 99]  Age/Sex: 80 y o  male  Admission Orders:  Scheduled Medications:  aspirin, 81 mg, Oral, Daily  atorvastatin, 20 mg, Oral, Daily  cholecalciferol, 1,000 Units, Oral, Daily  docusate sodium, 100 mg, Oral, BID  fish oil, 1,000 mg, Oral, Daily  metoprolol tartrate, 12 5 mg, Oral, Daily  pantoprazole, 40 mg, Oral, Early Morning      Continuous IV Infusions:  heparin (porcine), 3-30 Units/kg/hr (Order-Specific), Intravenous, Titrated      PRN Meds:  acetaminophen, 650 mg, Oral, Q6H PRN  heparin (porcine), 3,400 Units, Intravenous, Q6H PRN  heparin (porcine), 6,800 Units, Intravenous, Q6H PRN  ondansetron, 4 mg, Intravenous, Q6H PRN    BNP  Thrombosis panel  Lupus AC  Heparin drip, Tele  Echo  IP CONSULT TO PULMONOLOGY    Network Utilization Review Department  ATTENTION: Please call with any questions or concerns to [phone] and carefully listen to the prompts so that you are directed to the right person  All voicemails are confidential   Tori Cortes all requests for admission clinical reviews, approved or denied determinations and any other requests to dedicated fax number below belonging to the campus where the patient is receiving treatment   List of dedicated fax numbers for the Facilities:  FACILITY NAME UR FAX NUMBER   ADMISSION DENIALS (Administrative/Medical Necessity) [phone]   [address]  (Maternity/NICU/Pediatrics) [phone]   [address] 77 [phone]   [address] [address] Ev Ramos [facility] 28 [phone]   [facility] 765 [facility] [phone]

## 2023-03-31 PROBLEM — I82.403 LEG DVT (DEEP VENOUS THROMBOEMBOLISM), ACUTE, BILATERAL (HCC): Status: ACTIVE | Noted: 2023-03-31

## 2023-03-31 LAB
APTT PPP: 89 SECONDS (ref 23–37)
B2 GLYCOPROT1 IGA SERPL IA-ACNC: 1.8
B2 GLYCOPROT1 IGG SERPL IA-ACNC: 1.6
B2 GLYCOPROT1 IGM SERPL IA-ACNC: <2.4
CARDIOLIPIN IGA SER IA-ACNC: 2.4
CARDIOLIPIN IGG SER IA-ACNC: 1.1
CARDIOLIPIN IGM SER IA-ACNC: 1.3

## 2023-03-31 RX ADMIN — PANTOPRAZOLE SODIUM 40 MG: 40 TABLET, DELAYED RELEASE ORAL at 05:01

## 2023-03-31 RX ADMIN — OMEGA-3 FATTY ACIDS CAP 1000 MG 1000 MG: 1000 CAP at 08:49

## 2023-03-31 RX ADMIN — HEPARIN SODIUM 12 UNITS/KG/HR: 10000 INJECTION, SOLUTION INTRAVENOUS at 16:49

## 2023-03-31 RX ADMIN — Medication 12.5 MG: at 08:49

## 2023-03-31 RX ADMIN — DOCUSATE SODIUM 100 MG: 100 CAPSULE, LIQUID FILLED ORAL at 17:48

## 2023-03-31 RX ADMIN — Medication 1000 UNITS: at 08:49

## 2023-03-31 RX ADMIN — ASPIRIN 81 MG: 81 TABLET, COATED ORAL at 08:49

## 2023-03-31 NOTE — UTILIZATION REVIEW
Notification of Unplanned, Urgent, or   Emergency Inpatient Admission   [address] [address] [address] [facility]  Tax ID: [id number]  NPI: [id number]  [facility] Service[facility]  [facility]  Admission Level of Care: Inpatient  Place of Service Code: 24     Attending Physician Information  Attending Name and NPI#: Deb Golden [[id number]]  Phone: [phone]     Admission Information  Inpatient Admission Date/Time: 3/29/23  5:01 PM  Discharge Date/Time: No discharge date for patient encounter  Admitting Diagnosis Code/Description:  Dyspnea [R06 00]  SOB (shortness of breath) [R06 02]  Tachycardia [R00 0]  Elevated troponin [R77 8]  Hypoxemia requiring supplemental oxygen [R09 02, Z99 81]  Bilateral pulmonary embolism ([facility] [address] ) [I26 99]     Utilization Review Contact  Asha Salinas Utilization   Phone: [phone]  Fax: [phone]  Email: [email or web address]  Contact for approvals/pending authorizations, clinical reviews, and discharge  Physician Advisory Services Contact  Medical Necessity Denial & Zqhu-ub-Licq Discussion  Phone: [phone]  Fax: [phone]  Email: [email or web address]

## 2023-03-31 NOTE — PLAN OF CARE
Problem: PHYSICAL THERAPY ADULT  Goal: Performs mobility at highest level of function for planned discharge setting  See evaluation for individualized goals  Description: Treatment/Interventions: Functional transfer training, LE strengthening/ROM, Elevations, Therapeutic exercise, Endurance training, Patient/family training, Equipment eval/education, Bed mobility, Gait training, Compensatory technique education, Continued evaluation, Spoke to nursing, Family  Equipment Recommended: Other (Comment), Walker (monitor- may benefit from trial of rollator walker)       See flowsheet documentation for full assessment, interventions and recommendations  Outcome: Progressing  Note: Prognosis: Good  Problem List: Decreased strength, Decreased endurance, Impaired balance, Decreased mobility  Assessment: Pt  Seen for PT treatment session as per POC  Pt  rec'd seated out of bed in chair upon arrival   Pt  Offers no c/o pain  Pt  Reports feeling better but breathing is up and down  Pt  Is functioning at supervision with cues hand placement with transfers, pt demonstrates poor carryover with learned techniques  Pt  Is easily distractible and requires frequent redirection t/o PT session  Pt  Performs gait training with use of Rw on levels with close supervision to cg- min assist with turns due to unsteadiness, lateral displacement and L le adduction  Pt  Progressed with ambulation distances to 50', 150' and 225' seated rest breaks between each gait trial due to fatigue  Mild SOB noted during ambulation  Noted  Improved SPO2 this session 92% on RA with ambulation, 97% at rest   Pt  Performs seated b/l le arom and isometric exercises without difficulty however frequent redirection to remain at activity and maintain focus to task  The patient's AM-PAC Basic Mobility Inpatient Short Form Raw Score is 22  A Raw score of greater than 16 suggests the patient may benefit from discharge to home   Please also refer to the recommendation of the Physical Therapist for safe discharge planning  Recommend d/c to home with family support and OPPT  Pt may benefit from use of rollator walker for out in the community for energy conservation and use of standard Rw at home  PT Discharge Recommendation: Home with outpatient rehabilitation    See flowsheet documentation for full assessment

## 2023-03-31 NOTE — CASE MANAGEMENT
Case Management Assessment & Discharge Planning Note    Patient name Kasey Kumar  [address] [facility] MRN [id number]  : 1935 Date 3/31/2023       Current Admission Date: 3/29/2023  Current Admission Diagnosis:Acute pulmonary embolism without acute cor pulmonale [facility])   Patient Active Problem List    Diagnosis Date Noted   • Leg DVT (deep venous thromboembolism), acute, bilateral ([facility] [address] ) 2023   • Acute pulmonary embolism without acute cor pulmonale ([facility] [address] ) 2023   • Elevated troponin 2023   • BPH (benign prostatic hyperplasia)    • GERD (gastroesophageal reflux disease)    • HLD (hyperlipidemia)    • Hypertension       LOS (days): 2  Geometric Mean LOS (GMLOS) (days): 2 50  Days to GMLOS:0 5     OBJECTIVE:    Risk of Unplanned Readmission Score: 9 73         Current admission status: Inpatient       Preferred Pharmacy:   [address] [facility], [facility]  Phone: [phone] Fax: [phone] #[id number] LAUREL Colon - [address]  [facility] [address]  Phone: [phone] Fax: [phone]    Primary Care Provider: No primary care provider on file      Primary Insurance: BLUE CROSS [employer] REP  Secondary Insurance:     ASSESSMENT:  [address], [facility] Representative - Daughter   Primary Phone: [phone] (Mobile)               Advance Directives  Does patient have a [address] [place] [address] Avenue?: Yes  Does patient have Advance Directives?: Yes  Advance Directives: Power of  for health care  Primary Contact: Rajesh Cordovachiorre/Daughter ([phone])    Readmission Root Cause  30 Day Readmission: No    Patient Information  Admitted from[de-identified] Home  Mental Status: Alert  During Assessment patient was accompanied by: Daughter  Assessment information provided by[de-identified] Daughter  Primary Caregiver: Self  Support Systems: Daughter, Family members  [place] of Residence: Other (specify in comment box) ([address] [facility])  What city do you live in?: [address] [facility] entry access options   Select all that apply : Garage  Type of Current Residence: Bi-level (Staying at daughter's house in [place] for a bit prior to admission and that's where he will be discharging to )  Upon entering residence, is there a bedroom on the main floor (no further steps)?: Yes  Upon entering residence, is there a bathroom on the main floor (no further steps)?: Yes  In the last 12 months, how many places have you lived?: 2 (Pt's home in [facility] and daughter's home in [place])  In the last 12 months, was there a time when you did not have a steady place to sleep or slept in a shelter (including now)?: No  Homeless/housing insecurity resource given?: N/A  Living Arrangements: Lives w/ Daughter    Activities of Daily Living Prior to Admission  Functional Status: Independent  Completes ADLs independently?: Yes  Ambulates independently?: Yes  Does patient use assisted devices?: No  Does patient currently own DME?: Yes  What DME does the patient currently own?: Drew Peralta, Bedside Commode, Shower Chair  Does patient have a history of Outpatient Therapy (PT/OT)?: Yes  Does the patient have a history of Short-Term Rehab?: No  Does patient have a history of HHC?: No  Does patient currently have [address]?: No    Patient Information Continued  Income Source: SSI/SSD  Does patient have prescription coverage?: Yes  Within the past 12 months, you worried that your food would run out before you got the money to buy more : Never true  Within the past 12 months, the food you bought just didn't last and you didn't have money to get more : Never true  Food insecurity resource given?: N/A  Does patient receive dialysis treatments?: No  Does patient have a history of substance abuse?: No  Does patient have a history of Mental Health Diagnosis?: No    Means of Transportation  Means of Transport to Appts[de-identified] Family transport  In the past 12 months, has lack of transportation kept you from medical appointments or from getting medications?: No  In the past 12 months, has lack of transportation kept you from meetings, work, or from getting things needed for daily living?: No  Was application for public transport provided?: N/A    DISCHARGE DETAILS:    Discharge planning discussed with[de-identified] Pt's daughterCharisse of Choice: Yes  Comments - Freedom of Choice: No discharge needs identified at thist charanjit  CM contacted family/caregiver?: Yes  Were Treatment Team discharge recommendations reviewed with patient/caregiver?: Yes  Did patient/caregiver verbalize understanding of patient care needs?: Yes  Were patient/caregiver advised of the risks associated with not following Treatment Team discharge recommendations?: Yes    Contacts  Patient Contacts: Gabbi Pulido Jose De Jesuse/Daughter  Relationship to Patient[de-identified] Family  Contact Method: Phone  Phone Number: [phone]  Reason/Outcome: Continuity of Care, Emergency Contact, Discharge [address]         Is the patient interested in [address] [place] [address] at discharge?: No    DME Referral Provided  Referral made for DME?: No    Other Referral/Resources/Interventions Provided:  Interventions: None Indicated    Treatment Team Recommendation: Home  Discharge Destination Plan[de-identified] Home  Transport at Discharge : Family     Additional Comments: Pt's daughter reports that pt will be staying with her in her bilevel home that has 0STE from the garage and a first floor setup  She reports that she and her other siblings take turns managing pt's care  Pt is independent but they assist with medication management and taking pt to appointments  Pt's daughter reports that she has applied the free 30 day coupon for Eliquis and has picked the medication up at the pharmacy already   No discharge concerns or needs expressed or identified at this time, [facility] department to remain available

## 2023-03-31 NOTE — PROGRESS NOTES
"[address] [place] [address]  Progress Note  Name: Dewayne Tejeda  MRN: [id number]  Unit/Bed#: E4 -01 I Date of Admission: 3/29/2023   Date of Service: 3/31/2023 I Hospital Day: 2    Assessment/Plan   * Acute pulmonary embolism without acute cor pulmonale (HCC)  Assessment & Plan  · Presents with several weeks of worsening shortness of breath; recent diagnosis of COVID 1 month ago  Tachycardic, tachypneic, with slight increase in white blood count; sepsis alert called  However, subsequently found to have D-dimer > 20 00;     · CTA chest: \"Bilateral acute occlusive pulmonary emboli extending from the distal lobar pulmonary arteries into the segmental and subsegmental branches  The calculated ratio of right ventricular to left ventricular diameter (RV/LV ratio) is 1 4 This is greater than 0 9, which is abnormal and indicates right heart strain  An abnormal RV/LV ratio has been shown to be associated with an increased risk of 30 day mortality in the setting of acute pulmonary embolism  \"    · Procalcitonin negative and no further need for antibiotics as this is a PE  · Currently on heparin drip- will continue for another 24 hrs  · Thrombosis panel drawn and is pending  · Initially required 2 L oxygen nasal cannula however now on room air  · Likely provoked in the setting of acute illness given recent COVID diagnosis and patient's inactivity at that time  · Sent prescription to pharmacy to price check Eliquis  · Lower extremity venous duplex: confirms bilateral acute DVTs  · Echocardiogram: EF 51%, right ventricular cavity moderate to severely dilated, pulmonary artery systolic pressure moderately increased  · Seen and evaluated by pulmonary team- needs at least 6 months of anticoagulation  · Will also need outpatient followup with pulmonary  · PT OT recommending outpatient PT    Elevated troponin  Assessment & Plan  · Troponins noted to be elevated in the ED; 50--63--63  · EKG with some T wave abnormality; " but no other acute ischemic findings  · Patient denies chest pain  · Troponins likely elevated in the setting of demand ischemia given bilateral Pe's  · Currently on heparin drip for acute PE    Leg DVT (deep venous thromboembolism), acute, bilateral (HCC)  Assessment & Plan  · Lower extremity venous duplex: with bilateral acute DVTs   -Acute R DVT to popliteal and posterior tibial    -Acute L DVT in gastrocnemius vein   · Already on anticoagulation with heparin drip  · Anticipate transition to oral eliquis tomorrow  · Will discontinue lower extremity SCDs    Hypertension  Assessment & Plan  · Home regimen metoprolol tartrate 12 5 mg daily  · Add metoprolol 5 mg as needed SBP greater than 160    HLD (hyperlipidemia)  Assessment & Plan  · Continue statin therapy    GERD (gastroesophageal reflux disease)  Assessment & Plan  · Continue PPI therapy    BPH (benign prostatic hyperplasia)  Assessment & Plan  · History of BPH  · No difficulty urinating           VTE Pharmacologic Prophylaxis:   Pharmacologic: Heparin Drip  Mechanical VTE Prophylaxis in Place: Yes    Discharge Plan: With for continued inpatient stay for heparin drip  Expect transition to oral Eliquis tomorrow with discharge home if continues to improve    Discussions with Specialists or Other Care Team Provider: Nursing, case management, Dr Akin Lizama    Education and Discussions with Family / Patient: Patient, daughter via telephone-updated    Time Spent for Care: 45 minutes  More than 50% of total time spent on counseling and coordination of care as described above  Current Length of Stay: 2 day(s)  Current Patient Status: Inpatient   Code Status: Level 1 - Full Code    Subjective:   Resting in chair at bedside  He reports feeling good today  No complaints of shortness of breath or chest pain  He remains on room air  Denies any calf pain or tenderness    Discussed findings of echo and lower extremity venous duplex with daughter via telephone  Objective:     Vitals:   Temp (24hrs), Av 1 °F (36 7 °C), Min:98 °F (36 7 °C), Max:98 2 °F (36 8 °C)    Temp:  [98 °F (36 7 °C)-98 2 °F (36 8 °C)] 98 °F (36 7 °C)  HR:  [58-69] 58  Resp:  [18] 18  BP: (144-162)/(73-89) 162/73  SpO2:  [94 %-97 %] 97 %  Body mass index is 32 28 kg/m²  Input and Output Summary (last 24 hours):     No intake or output data in the 24 hours ending 23 1214    Physical Exam:     Physical Exam  Vitals and nursing note reviewed  Constitutional:       General: He is not in acute distress  Appearance: Normal appearance  He is normal weight  He is not ill-appearing, toxic-appearing or diaphoretic  HENT:      Head: Normocephalic and atraumatic  Eyes:      General: No scleral icterus  Cardiovascular:      Rate and Rhythm: Normal rate and regular rhythm  Pulmonary:      Effort: Pulmonary effort is normal  No respiratory distress  Breath sounds: Normal breath sounds  No stridor  No wheezing or rhonchi  Abdominal:      General: Bowel sounds are normal  There is no distension  Palpations: Abdomen is soft  There is no mass  Tenderness: There is no abdominal tenderness  Hernia: No hernia is present  Musculoskeletal:         General: No swelling  Cervical back: Neck supple  Skin:     General: Skin is warm and dry  Neurological:      Mental Status: He is oriented to person, place, and time  Mental status is at baseline     Psychiatric:         Mood and Affect: Mood normal          Behavior: Behavior normal          Additional Data:     Labs:    Results from last 7 days   Lab Units 23  0500   WBC Thousand/uL 10 65*   HEMOGLOBIN g/dL 14 5   HEMATOCRIT % 44 1   PLATELETS Thousands/uL 175   NEUTROS PCT % 53   LYMPHS PCT % 34   MONOS PCT % 9   EOS PCT % 2     Results from last 7 days   Lab Units 23  0500   POTASSIUM mmol/L 4 2   CHLORIDE mmol/L 112*   CO2 mmol/L 22   BUN mg/dL 19   CREATININE mg/dL 1 03   CALCIUM mg/dL 9 1 ALK PHOS U/L 81   ALT U/L 33   AST U/L 24     Results from last 7 days   Lab Units 03/29/23  1234   INR  1 17       * I Have Reviewed All Lab Data Listed Above  * Additional Pertinent Lab Tests Reviewed: Arthur 66 Admission Reviewed    Imaging:    Imaging Reports Reviewed Today Include: Echo, lower extremity venous duplex  Imaging Personally Reviewed by Myself Includes:      Recent Cultures (last 7 days):     Results from last 7 days   Lab Units 03/29/23  1244 03/29/23  1234   BLOOD CULTURE  No Growth at 24 hrs  No Growth at 24 hrs  Last 24 Hours Medication List:   Current Facility-Administered Medications   Medication Dose Route Frequency Provider Last Rate   • acetaminophen  650 mg Oral Q6H PRN Topher De La Garza MD     • aspirin  81 mg Oral Daily Topher De La Garza MD     • atorvastatin  20 mg Oral Daily Topher De La Garza MD     • cholecalciferol  1,000 Units Oral Daily Topher De La Garza MD     • docusate sodium  100 mg Oral BID Topher De La Garza MD     • fish oil  1,000 mg Oral Daily Topher De La Garza MD     • heparin (porcine)  3-30 Units/kg/hr (Order-Specific) Intravenous Titrated Topher De La Garza MD 12 Units/kg/hr (03/30/23 1435)   • heparin (porcine)  3,400 Units Intravenous Q6H PRN Topher De La Garza MD     • heparin (porcine)  6,800 Units Intravenous Q6H PRN Topher De La Garza MD     • metoprolol  5 mg Intravenous Q6H PRN Hyun Fulton PA-C     • metoprolol tartrate  12 5 mg Oral Daily Topher De La Garza MD     • ondansetron  4 mg Intravenous Q6H PRN Topher De La Garza MD     • pantoprazole  40 mg Oral Early Morning Topher De La Garza MD          Today, Patient Was Seen By: Hyun Fulton PA-C    ** Please Note: This note has been constructed using a voice recognition system   **

## 2023-03-31 NOTE — PHYSICAL THERAPY NOTE
PHYSICAL THERAPY NOTE          Patient Name: Margy Tuttle  QQUXS'S Date: 3/31/2023    03/31/23 1601   Note Type   Note Type Treatment   Pain Assessment   Pain Assessment Tool 0-10   Pain Score No Pain   Restrictions/Precautions   Other Precautions Multiple lines;Telemetry; Fall Risk   General   Chart Reviewed Yes   Family/Caregiver Present Yes  (daughters)   Subjective   Subjective PT agreeable to PT  My breathing is up and down  Bed Mobility   Additional Comments pt out of bed and remained out of bed at conclusion of PT session  Transfers   Sit to Stand 5  Supervision   Additional items Assist x 1; Armrests; Increased time required;Verbal cues   Stand to Sit 5  Supervision   Additional items Assist x 1; Increased time required;Verbal cues;Armrests   Additional Comments cues for hand placement with sit to stand and stand to sit  Ambulation/Elevation   Gait pattern Improper Weight shift;Narrow RETA; Short stride; Forward Flexion; Inconsistent mark  (L le adducts)   Gait Assistance 5  Supervision  (to cg- with turning corners)   Additional items Verbal cues; Assist x 1   Assistive Device Rolling walker   Distance 225' x1, 150' x1, 50' x1 seated rest breaks between gait trials  spo2 on ra 92% with ambulatin and 97% at rest    Activity Tolerance   Activity Tolerance Patient limited by fatigue   Exercises   Hip Flexion Sitting;10 reps;AROM; Bilateral   Hip Abduction Sitting;10 reps;AROM; Bilateral   Hip Adduction Sitting;10 reps;Bilateral  (isometric hip add )   Knee AROM Long Arc Quad Sitting;10 reps;AROM; Bilateral   Ankle Pumps Sitting;10 reps;AROM; Bilateral   Marching Sitting;10 reps;AROM; Bilateral   Assessment   Prognosis Good   Problem List Decreased strength;Decreased endurance; Impaired balance;Decreased mobility   Assessment Pt  Seen for PT treatment session as per POC    Pt  rec'd seated out of bed in chair upon arrival   Pt  Offers no c/o pain  Pt  Reports feeling better but breathing is up and down  Pt  Is functioning at supervision with cues hand placement with transfers, pt demonstrates poor carryover with learned techniques  Pt  Is easily distractible and requires frequent redirection t/o PT session  Pt  Performs gait training with use of Rw on levels with close supervision to cg- min assist with turns due to unsteadiness, lateral displacement and L le adduction  Pt  Progressed with ambulation distances to 50', 150' and 225' seated rest breaks between each gait trial due to fatigue  Mild SOB noted during ambulation  Noted  Improved SPO2 this session 92% on RA with ambulation, 97% at rest   Pt  Performs seated b/l le arom and isometric exercises without difficulty however frequent redirection to remain at activity and maintain focus to task  The patient's [facility] Basic Mobility Inpatient Short Form Raw Score is 22  A Raw score of greater than 16 suggests the patient may benefit from discharge to home  Please also refer to the recommendation of the Physical Therapist for safe discharge planning  Recommend d/c to home with family support and OPPT  Pt may benefit from use of rollator walker for out in the community for energy conservation and use of standard Rw at home  Goals   Patient Goals to go home  STG Expiration Date 04/09/23   PT Treatment Day 1   Plan   Treatment/Interventions Functional transfer training;LE strengthening/ROM; Therapeutic exercise; Endurance training;Patient/family training;Equipment eval/education;Gait training;Family   Progress Progressing toward goals   PT Frequency 3-5x/wk   Recommendation   PT Discharge Recommendation Home with outpatient rehabilitation   AM-[facility] Basic Mobility Inpatient   Turning in Flat Bed Without Bedrails 4   Lying on Back to Sitting on Edge of Flat Bed Without Bedrails 3   Moving Bed to Chair 4   Standing Up From Chair Using Arms 4   Walk in Room 4   Climb 3-5 Stairs With Railing 3 Basic Mobility Inpatient Raw Score 22   Basic Mobility Standardized Score 47 4   Highest Level Of Mobility   -HLM Goal 7: Walk 25 feet or more   JH-HLM Achieved 8: Walk 250 feet ot more  (total combined distance > 250')   Education   Education Provided Mobility training;Home exercise program;Assistive device   End of Consult   Patient Position at End of Consult Bedside chair; All needs within reach;Bed/Chair alarm activated     Mavis Naranjo, DYAN

## 2023-03-31 NOTE — PROGRESS NOTES
"Progress Note - Pulmonary   Halle Sunny 80 y o  male MRN: [id number]  Unit/Bed#: E4 -01 Encounter: [id number]    Assessment/Plan:    1  Acute hypoxic respiratory failure likely secondary to acute PE        -Patient currently on room air-patient percent, does not wear home O2        -*Maintain saturations greater than 88%        -Pulmonary toileting: Deep breathing cough, OOB as tolerated, IS Q 1 hr        -Recommend ambulatory pulse ox prior to discharge in severity of RV dysfunction    2  Acute B/L submassive occlusive likely provoked PEs w/  severe RV dilation        -B/L occlusive PE from distal lower PA into segmental subsegmental branches        -Likely secondary to COVID-19        -Echo-slight Belle sign, PA pressure 52 mmHg        -Recommend updating all cancer markers        -Given RV dysfunction will likely need 6 months of anticoagulation        -We will need repeat echo in 3 months        -Given patient's age not a candidate for thrombolytic/embolectomy          3  H/O COVID-19- 2/8/2023       -Patient completed course of Paxlovid       -No indication for any further treatment        -Pulmonary will sign off  -Outpatient follow-up per discharge instructions      Chief Complaint:    \"I feel pretty good\"    Subjective:    Halle was comfortably sitting in his bed     Objective:    Vitals: Blood pressure 162/73, pulse 58, temperature 98 °F (36 7 °C), temperature source Temporal, resp  rate 18, height 5' 5\" (1 651 m), weight 88 kg (194 lb), SpO2 97 %  RA,Body mass index is 32 28 kg/m²  No intake or output data in the 24 hours ending 03/31/23 1202    Invasive Devices     Peripheral Intravenous Line  Duration           Peripheral IV 03/29/23 Left Antecubital 1 day                Physical Exam:   Physical Exam  Constitutional:       General: He is not in acute distress  Appearance: Normal appearance  He is normal weight  He is not ill-appearing  HENT:      Head: Normocephalic and atraumatic       " Nose: Nose normal  No congestion or rhinorrhea  Mouth/Throat:      Mouth: Mucous membranes are dry  Pharynx: No oropharyngeal exudate or posterior oropharyngeal erythema  Cardiovascular:      Rate and Rhythm: Normal rate and regular rhythm  Pulses: Normal pulses  Heart sounds: Normal heart sounds  No murmur heard  No friction rub  No gallop  Pulmonary:      Effort: Pulmonary effort is normal  No tachypnea, bradypnea, accessory muscle usage or respiratory distress  Breath sounds: Decreased air movement present  No stridor  Decreased breath sounds present  No wheezing, rhonchi or rales  Chest:      Chest wall: No tenderness  Abdominal:      General: Abdomen is flat  Bowel sounds are normal  There is no distension  Palpations: Abdomen is soft  There is no mass  Musculoskeletal:         General: No swelling or tenderness  Normal range of motion  Cervical back: Normal range of motion  No rigidity or tenderness  Skin:     General: Skin is warm and dry  Coloration: Skin is not jaundiced or pale  Neurological:      General: No focal deficit present  Mental Status: He is alert and oriented to person, place, and time  Mental status is at baseline  Psychiatric:         Mood and Affect: Mood normal          Behavior: Behavior normal          Labs:  I have personally reviewed pertinent lab results 3/30/2023    Imaging and other studies: I have personally reviewed pertinent films in PACS     CTA chest-bilateral acute occlusive PE sending from distal lower pulmonary arteries into segmental subsegmental branches

## 2023-03-31 NOTE — ASSESSMENT & PLAN NOTE
· Lower extremity venous duplex: with bilateral acute DVTs   -Acute R DVT to popliteal and posterior tibial    -Acute L DVT in gastrocnemius vein   · Already on anticoagulation with heparin drip  · Anticipate transition to oral eliquis tomorrow  · Will discontinue lower extremity SCDs

## 2023-03-31 NOTE — PLAN OF CARE
Problem: Potential for Falls  Goal: Patient will remain free of falls  Description: INTERVENTIONS:  - Educate patient/family on patient safety including physical limitations  - Instruct patient to call for assistance with activity   - Consult OT/PT to assist with strengthening/mobility   - Keep Call bell within reach  - Keep bed low and locked with side rails adjusted as appropriate  - Keep care items and personal belongings within reach  - Initiate and maintain comfort rounds  - Make Fall Risk Sign visible to staff  - Offer Toileting every  Hours, in advance of need  - Initiate/Maintain alarm  - Obtain necessary fall risk management equipment:   - Apply yellow socks and bracelet for high fall risk patients  - Consider moving patient to room near nurses station  Outcome: Progressing     Problem: PAIN - ADULT  Goal: Verbalizes/displays adequate comfort level or baseline comfort level  Description: Interventions:  - Encourage patient to monitor pain and request assistance  - Assess pain using appropriate pain scale  - Administer analgesics based on type and severity of pain and evaluate response  - Implement non-pharmacological measures as appropriate and evaluate response  - Consider cultural and social influences on pain and pain management  - Notify physician/advanced practitioner if interventions unsuccessful or patient reports new pain  Outcome: Progressing     Problem: INFECTION - ADULT  Goal: Absence or prevention of progression during hospitalization  Description: INTERVENTIONS:  - Assess and monitor for signs and symptoms of infection  - Monitor lab/diagnostic results  - Monitor all insertion sites, i e  indwelling lines, tubes, and drains  - Monitor endotracheal if appropriate and nasal secretions for changes in amount and color  - [place] appropriate cooling/warming therapies per order  - Administer medications as ordered  - Instruct and encourage patient and family to use good hand hygiene technique  - Identify and instruct in appropriate isolation precautions for identified infection/condition  Outcome: Progressing  Goal: Absence of fever/infection during neutropenic period  Description: INTERVENTIONS:  - Monitor WBC    Outcome: Progressing     Problem: SAFETY ADULT  Goal: Patient will remain free of falls  Description: INTERVENTIONS:  - Educate patient/family on patient safety including physical limitations  - Instruct patient to call for assistance with activity   - Consult OT/PT to assist with strengthening/mobility   - Keep Call bell within reach  - Keep bed low and locked with side rails adjusted as appropriate  - Keep care items and personal belongings within reach  - Initiate and maintain comfort rounds  - Make Fall Risk Sign visible to staff  - Offer Toileting every  Hours, in advance of need  - Initiate/Maintain alarm  - Obtain necessary fall risk management equipment:   - Apply yellow socks and bracelet for high fall risk patients  - Consider moving patient to room near nurses station  Outcome: Progressing  Goal: Maintain or return to baseline ADL function  Description: INTERVENTIONS:  -  Assess patient's ability to carry out ADLs; assess patient's baseline for ADL function and identify physical deficits which impact ability to perform ADLs (bathing, care of mouth/teeth, toileting, grooming, dressing, etc )  - Assess/evaluate cause of self-care deficits   - Assess range of motion  - Assess patient's mobility; develop plan if impaired  - Assess patient's need for assistive devices and provide as appropriate  - Encourage maximum independence but intervene and supervise when necessary  - Involve family in performance of ADLs  - Assess for home care needs following discharge   - Consider OT consult to assist with ADL evaluation and planning for discharge  - Provide patient education as appropriate  Outcome: Progressing  Goal: Maintains/Returns to pre admission functional level  Description: INTERVENTIONS:  - Perform BMAT or MOVE assessment daily    - Set and communicate daily mobility goal to care team and patient/family/caregiver  - Collaborate with rehabilitation services on mobility goals if consulted  - Perform Range of Motion  times a day  - Reposition patient every  hours  - Dangle patient  times a day  - Stand patient  times a day  - Ambulate patient  times a day  - Out of bed to chair  times a day   - Out of bed for meals  times a day  - Out of bed for toileting  - Record patient progress and toleration of activity level   Outcome: Progressing     Problem: DISCHARGE PLANNING  Goal: Discharge to home or other facility with appropriate resources  Description: INTERVENTIONS:  - Identify barriers to discharge w/patient and caregiver  - Arrange for needed discharge resources and transportation as appropriate  - Identify discharge learning needs (meds, wound care, etc )  - Arrange for interpretive services to assist at discharge as needed  - Refer to Case Management Department for coordinating discharge planning if the patient needs post-hospital services based on physician/advanced practitioner order or complex needs related to functional status, cognitive ability, or social support system  Outcome: Progressing     Problem: Knowledge Deficit  Goal: Patient/family/caregiver demonstrates understanding of disease process, treatment plan, medications, and discharge instructions  Description: Complete learning assessment and assess knowledge base    Interventions:  - Provide teaching at level of understanding  - Provide teaching via preferred learning methods  Outcome: Progressing     Problem: CARDIOVASCULAR - ADULT  Goal: Maintains optimal cardiac output and hemodynamic stability  Description: INTERVENTIONS:  - Monitor I/O, vital signs and rhythm  - Monitor for S/S and trends of decreased cardiac output  - Administer and titrate ordered vasoactive medications to optimize hemodynamic stability  - Assess quality of pulses, skin color and temperature  - Assess for signs of decreased coronary artery perfusion  - Instruct patient to report change in severity of symptoms  Outcome: Progressing  Goal: Absence of cardiac dysrhythmias or at baseline rhythm  Description: INTERVENTIONS:  - Continuous cardiac monitoring, vital signs, obtain 12 lead EKG if ordered  - Administer antiarrhythmic and heart rate control medications as ordered  - Monitor electrolytes and administer replacement therapy as ordered  Outcome: Progressing     Problem: RESPIRATORY - ADULT  Goal: Achieves optimal ventilation and oxygenation  Description: INTERVENTIONS:  - Assess for changes in respiratory status  - Assess for changes in mentation and behavior  - Position to facilitate oxygenation and minimize respiratory effort  - Oxygen administered by appropriate delivery if ordered  - Initiate smoking cessation education as indicated  - Encourage broncho-pulmonary hygiene including cough, deep breathe, Incentive Spirometry  - Assess the need for suctioning and aspirate as needed  - Assess and instruct to report SOB or any respiratory difficulty  - Respiratory Therapy support as indicated  Outcome: Progressing

## 2023-03-31 NOTE — ASSESSMENT & PLAN NOTE
"· Presents with several weeks of worsening shortness of breath; recent diagnosis of COVID 1 month ago  Tachycardic, tachypneic, with slight increase in white blood count; sepsis alert called  However, subsequently found to have D-dimer > 20 00;     · CTA chest: \"Bilateral acute occlusive pulmonary emboli extending from the distal lobar pulmonary arteries into the segmental and subsegmental branches  The calculated ratio of right ventricular to left ventricular diameter (RV/LV ratio) is 1 4 This is greater than 0 9, which is abnormal and indicates right heart strain  An abnormal RV/LV ratio has been shown to be associated with an increased risk of 30 day mortality in the setting of acute pulmonary embolism  \"    · Procalcitonin negative and no further need for antibiotics as this is a PE  · Currently on heparin drip- will continue for another 24 hrs  · Thrombosis panel drawn and is pending  · Initially required 2 L oxygen nasal cannula however now on room air  · Likely provoked in the setting of acute illness given recent COVID diagnosis and patient's inactivity at that time  · Sent prescription to pharmacy to price check Eliquis  · Lower extremity venous duplex: confirms bilateral acute DVTs  · Echocardiogram: EF 51%, right ventricular cavity moderate to severely dilated, pulmonary artery systolic pressure moderately increased  · Seen and evaluated by pulmonary team- needs at least 6 months of anticoagulation  · Will also need outpatient followup with pulmonary  · PT OT recommending outpatient PT  "

## 2023-03-31 NOTE — ASSESSMENT & PLAN NOTE
· Home regimen metoprolol tartrate 12 5 mg daily  · Add metoprolol 5 mg as needed SBP greater than 160

## 2023-04-01 VITALS
HEART RATE: 59 BPM | WEIGHT: 194 LBS | OXYGEN SATURATION: 90 % | DIASTOLIC BLOOD PRESSURE: 68 MMHG | SYSTOLIC BLOOD PRESSURE: 155 MMHG | HEIGHT: 65 IN | TEMPERATURE: 97.9 F | BODY MASS INDEX: 32.32 KG/M2 | RESPIRATION RATE: 18 BRPM

## 2023-04-01 LAB
APTT PPP: 111 SECONDS (ref 23–37)
DME PARACHUTE DELIVERY DATE REQUESTED: NORMAL
DME PARACHUTE ITEM DESCRIPTION: NORMAL
DME PARACHUTE ORDER STATUS: NORMAL
DME PARACHUTE SUPPLIER NAME: NORMAL
DME PARACHUTE SUPPLIER PHONE: NORMAL
PROT S ACT/NOR PPP: 94 % (ref 61–136)
PROT S PPP-ACNC: 92 % (ref 60–150)

## 2023-04-01 RX ADMIN — ASPIRIN 81 MG: 81 TABLET, COATED ORAL at 08:41

## 2023-04-01 RX ADMIN — APIXABAN 10 MG: 5 TABLET, FILM COATED ORAL at 09:25

## 2023-04-01 RX ADMIN — Medication 1000 UNITS: at 08:41

## 2023-04-01 RX ADMIN — Medication 12.5 MG: at 08:41

## 2023-04-01 RX ADMIN — DOCUSATE SODIUM 100 MG: 100 CAPSULE, LIQUID FILLED ORAL at 08:41

## 2023-04-01 RX ADMIN — PANTOPRAZOLE SODIUM 40 MG: 40 TABLET, DELAYED RELEASE ORAL at 05:06

## 2023-04-01 RX ADMIN — OMEGA-3 FATTY ACIDS CAP 1000 MG 1000 MG: 1000 CAP at 08:41

## 2023-04-01 RX ADMIN — ATORVASTATIN CALCIUM 20 MG: 20 TABLET, FILM COATED ORAL at 08:41

## 2023-04-01 NOTE — DISCHARGE SUMMARY
"[facility]  Discharge- Twan Slim 1935, 80 y o  male MRN: [id number]  Unit/Bed#: E4 -01 Encounter: [id number]  Primary Care Provider: No primary care provider on file  Date and time admitted to hospital: 3/29/2023 11:59 AM    * Acute pulmonary embolism without acute cor pulmonale (HCC)  Assessment & Plan  · Presents with several weeks of worsening shortness of breath; recent diagnosis of COVID 1 month ago  Tachycardic, tachypneic, with slight increase in white blood count; sepsis alert called  However, subsequently found to have D-dimer > 20 00; and diagnosed with PE/DVT    · CTA chest: \"Bilateral acute occlusive pulmonary emboli extending from the distal lobar pulmonary arteries into the segmental and subsegmental branches  The calculated ratio of right ventricular to left ventricular diameter (RV/LV ratio) is 1 4 This is greater than 0 9, which is abnormal and indicates right heart strain  An abnormal RV/LV ratio has been shown to be associated with an increased risk of 30 day mortality in the setting of acute pulmonary embolism  \"    · PE likely provoked in the setting of acute illness given recent COVID diagnosis and patient's inactivity at that time  · Lower extremity venous duplex: confirms bilateral acute DVTs  · Procalcitonin negative and no further need for antibiotics as this is a PE  · S/p IV heparin drip  · Thrombosis panel drawn and is pending  · Initially required 2 L oxygen nasal cannula however now on room air  · 6-minute walk test and oxygen did not drop  · Echocardiogram: EF 51%, right ventricular cavity moderate to severely dilated, pulmonary artery systolic pressure moderately increased  · Seen and evaluated by pulmonary team- needs at least 6 months of anticoagulation  · Will also need outpatient followup with pulmonary  · PT OT recommending outpatient PT  · Transition heparin drip to oral Eliquis -will go home on loading dose    Elevated " troponin  Assessment & Plan  · Troponins noted to be elevated in the ED; 50--63--63  · EKG with some T wave abnormality; but no other acute ischemic findings  · Patient denies chest pain  · Troponins likely elevated in the setting of demand ischemia given bilateral Pe's  · Status post heparin drip for acute PE-now transitioned to Eliquis    Hypertension  Assessment & Plan  · Home regimen metoprolol tartrate 12 5 mg daily  · Had some elevated blood pressures here  · Want to avoid hypotension in the setting of recent bilateral submassive PEs  · Encouraged to follow-up with PCP in 1 week for blood pressure check as well as keep blood pressure log - may need increase in need an metoprolol dosage in the near future    Leg DVT (deep venous thromboembolism), acute, bilateral (HCC)  Assessment & Plan  · Lower extremity venous duplex: with bilateral acute DVTs   -Acute R DVT to popliteal and posterior tibial    -Acute L DVT in gastrocnemius vein   · Discontinue lower extremity SCDs  · Heparin drip transitioned to oral Eliquis    HLD (hyperlipidemia)  Assessment & Plan  · Continue statin therapy    GERD (gastroesophageal reflux disease)  Assessment & Plan  · Continue PPI therapy    BPH (benign prostatic hyperplasia)  Assessment & Plan  · History of BPH  · No difficulty urinating      Consultations During Hospital Stay:  · Pulmonology    Procedures Performed:   CTA ED chest PE study  Result Date: 3/29/2023  · Impression: Bilateral acute occlusive pulmonary emboli extending from the distal lobar pulmonary arteries into the segmental and subsegmental branches  The calculated ratio of right ventricular to left ventricular diameter (RV/LV ratio) is 1 4 This is greater than 0 9, which is abnormal and indicates right heart strain  An abnormal RV/LV ratio has been shown to be associated with an increased risk of 30 day mortality in the setting of acute pulmonary embolism   Findings concur with the preliminary report by the referring clinician already in PACS   The study was marked in [facility] for immediate notification  and/or our electronic record EPIC  Workstation performed: [id number]     Significant Findings / Test Results:   · Acute bilateral PEs  · Acute bilateral DVTs  · Elevated blood pressures  · Non-MI troponin elevation    Incidental Findings:   · None    Test Results Pending at Discharge (will require follow up): · Thrombosis panel     Outpatient follow-up requested:  · PCP-1 week  · Pulmonology- appointment already scheduled for 4/25/2023 at 9:55 AM    Complications: None    Hospital Course:     Janie Deleon is a 80 y o  male patient who originally presented to the hospital on 3/29/2023 due to worsening shortness of breath for the past 3 weeks  In the ED, patient was noted to have mild elevation of white count, tachycardia, tachypnea  He was also found to have an elevated D-dimer and CTA chest confirmed bilateral pulmonary embolism with right heart strain  He was admitted and an echocardiogram was performed  He was seen in consultation by pulmonology  Additionally a lower extremity venous duplex revealed bilateral lower extremity DVTs  Patient was on a heparin drip here  He remained hemodynamically stable and to be tapered off of oxygen  He was transitioned to oral Eliquis with loading dose  PE was felt to be provoked by recent diagnosis of COVID in February 2023  He will need to continue homewas Eliquis for approximately 6 months  Patient underwent 6-minute walk test with nursing and O2 saturations did not drop  He does not qualify for oxygen  Patient will follow-up with pulmonary in the outpatient setting to confirm duration of eliquis  In conclusion, patient is able for discharge at this time  Case and plan discussed with daughter Lexii Youngblood at bedside  Condition at Discharge: stable     Discharge Day Visit / Exam:     Subjective: Resting in bed  Denies any chest pain, chest pressure, palpitations    Was able to walk "without oxygen saturation dropping  Will not need oxygen upon discharge  Transition to oral Eliquis  Case and plan discussed with daughter  Vitals: Blood Pressure: 155/68 (04/01/23 0858)  Pulse: 59 (04/01/23 0858)  Temperature: 97 9 °F (36 6 °C) (04/01/23 0736)  Temp Source: Temporal (04/01/23 0736)  Respirations: 18 (04/01/23 0858)  Height: 5' 5\" (165 1 cm) (03/30/23 1115)  Weight - Scale: 88 kg (194 lb) (03/30/23 1115)  SpO2: 90 % (04/01/23 1150)     Exam:   Physical Exam  Vitals and nursing note reviewed  Constitutional:       General: He is not in acute distress  Appearance: Normal appearance  He is normal weight  He is not ill-appearing, toxic-appearing or diaphoretic  HENT:      Head: Normocephalic and atraumatic  Eyes:      General: No scleral icterus  Cardiovascular:      Rate and Rhythm: Normal rate and regular rhythm  Pulmonary:      Effort: Pulmonary effort is normal  No respiratory distress  Breath sounds: Normal breath sounds  No stridor  No wheezing or rhonchi  Abdominal:      General: Bowel sounds are normal  There is no distension  Palpations: Abdomen is soft  There is no mass  Tenderness: There is no abdominal tenderness  Hernia: No hernia is present  Musculoskeletal:         General: No swelling  Cervical back: Neck supple  Skin:     General: Skin is warm and dry  Neurological:      Mental Status: He is alert and oriented to person, place, and time  Mental status is at baseline  Psychiatric:         Mood and Affect: Mood normal          Behavior: Behavior normal        Discussion with Family: Daughter Nazario Stevenson    Discharge instructions/Information to patient and family:   See after visit summary for information provided to patient and family  Provisions for Follow-Up Care:  See after visit summary for information related to follow-up care and any pertinent home health orders       Planned Readmission: no     Discharge Statement:  I spent 55 " minutes discharging the patient  This time was spent on the day of discharge  I had direct contact with the patient on the day of discharge  Greater than 50% of the total time was spent examining patient, answering all patient questions, arranging and discussing plan of care with patient as well as directly providing post-discharge instructions  Additional time then spent on discharge activities  Discharge Medications:  See after visit summary for reconciled discharge medications provided to patient and family        ** Please Note: This note has been constructed using a voice recognition system **

## 2023-04-01 NOTE — ASSESSMENT & PLAN NOTE
· Home regimen metoprolol tartrate 12 5 mg daily  · Had some elevated blood pressures here  · Want to avoid hypotension in the setting of recent bilateral submassive PEs  · Encouraged to follow-up with PCP in 1 week for blood pressure check as well as keep blood pressure log - may need increase in need an metoprolol dosage in the near future

## 2023-04-01 NOTE — ASSESSMENT & PLAN NOTE
· Troponins noted to be elevated in the ED; 50--63--63  · EKG with some T wave abnormality; but no other acute ischemic findings  · Patient denies chest pain  · Troponins likely elevated in the setting of demand ischemia given bilateral Pe's  · Status post heparin drip for acute PE-now transitioned to Eliquis

## 2023-04-01 NOTE — ASSESSMENT & PLAN NOTE
"· Presents with several weeks of worsening shortness of breath; recent diagnosis of COVID 1 month ago  Tachycardic, tachypneic, with slight increase in white blood count; sepsis alert called  However, subsequently found to have D-dimer > 20 00; and diagnosed with PE/DVT    · CTA chest: \"Bilateral acute occlusive pulmonary emboli extending from the distal lobar pulmonary arteries into the segmental and subsegmental branches  The calculated ratio of right ventricular to left ventricular diameter (RV/LV ratio) is 1 4 This is greater than 0 9, which is abnormal and indicates right heart strain  An abnormal RV/LV ratio has been shown to be associated with an increased risk of 30 day mortality in the setting of acute pulmonary embolism  \"    · PE likely provoked in the setting of acute illness given recent COVID diagnosis and patient's inactivity at that time  · Lower extremity venous duplex: confirms bilateral acute DVTs  · Procalcitonin negative and no further need for antibiotics as this is a PE  · S/p IV heparin drip  · Thrombosis panel drawn and is pending  · Initially required 2 L oxygen nasal cannula however now on room air  · 6-minute walk test and oxygen did not drop  · Echocardiogram: EF 51%, right ventricular cavity moderate to severely dilated, pulmonary artery systolic pressure moderately increased  · Seen and evaluated by pulmonary team- needs at least 6 months of anticoagulation  · Will also need outpatient followup with pulmonary  · PT OT recommending outpatient PT  · Transition heparin drip to oral Eliquis -will go home on loading dose  "

## 2023-04-01 NOTE — DISCHARGE INSTR - AVS FIRST PAGE
Dear Neli Costa,     It was our pleasure to care for you here at [facility]  It is our hope that we were always able to exceed the expected standards for your care during your stay  You were hospitalized due to blood clots in your lungs  You were cared for on the 4th floor by Seema Narvaez PA-C under the service of Salvador Montenegro,  with the [address] [place] [address] [facility] who covers for your primary care physician (PCP), No primary care provider on file  , while you were hospitalized  For follow up as well as any medication refills, we recommend that you follow up with your primary care physician  A registered nurse will reach out to you by phone within a few days after your discharge to answer any additional questions that you may have after going home  However, at this time we provide for you here, the most important instructions / recommendations at discharge:      Notable Medication Adjustments   Started on blood thinner-Eliquis-be sure to take this medication every day  This medication was started for blood clots in your lungs and legs    Important follow up information   You are noted to have some elevated blood pressures  Please keep blood pressure log and take this to follow-up appointment with PCP in 1 week  You will be given the name and number to establish care within this area  You are able to go to physical therapy-I have sent a referral for you  You may go to any [facility] physical therapy location  Your pulmonology appointment is already scheduled for 4/25/2023    Please review this entire after visit summary as additional general instructions including medication list, appointments, activity, diet, any pertinent wound care, and other additional recommendations from your care team that may be provided for you        Sincerely,     Seema Narvaez PA-C

## 2023-04-01 NOTE — CASE MANAGEMENT
Case Management Discharge Planning Note    Patient name Mine Hassan  Location [place] [address] [address] 455/E4 [address] [facility]-* MRN [id number]  : 1935 Date 2023       Current Admission Date: 3/29/2023  Current Admission Diagnosis:Acute pulmonary embolism without acute cor pulmonale [facility])   Patient Active Problem List    Diagnosis Date Noted   • Leg DVT (deep venous thromboembolism), acute, bilateral ([facility] [address] ) 2023   • Acute pulmonary embolism without acute cor pulmonale ([facility] [address] ) 2023   • Elevated troponin 2023   • BPH (benign prostatic hyperplasia)    • GERD (gastroesophageal reflux disease)    • HLD (hyperlipidemia)    • Hypertension       LOS (days): 3  Geometric Mean LOS (GMLOS) (days): 2 50  Days to GMLOS:-0 4     OBJECTIVE:  Risk of Unplanned Readmission Score: 9 64         Current admission status: Inpatient   Preferred Pharmacy:   [address]  Phone: [phone] Fax: [phone][id number] [place], [address] [place]  [address] [facility] [address]  Phone: [phone] Fax: [phone]    Primary Care Provider: No primary care provider on file  Primary Insurance: [employer] REP  Secondary Insurance:     DISCHARGE DETAILS:    Discharge planning discussed with[de-identified] Pts daughter Fernando Palomino    Contacts  Patient Contacts: Fernando Palomino Melchiorre/Daughter  Relationship to Patient[de-identified] Family  Contact Method: Phone  Phone Number: [phone]  Reason/Outcome: Continuity of Care, Emergency Contact, Discharge Planning    DME Referral Provided  Referral made for DME?: Yes  DME referral completed for the following items[de-identified] Rona Caruso  DME Supplier Name[de-identified] [employer]    Treatment Team Recommendation: Home     Additional Comments: Patient's daughter requesting a roller walker, says he is unable to use his current one properly it has no wheels   CM delivered the roller walker to the E4 nurses station  Nurse stated Jade Marie left and would be right back  CM labled the walker, walker is at [facility]

## 2023-04-01 NOTE — NURSING NOTE
IV removed  AVS reviewed with daughter  Family did not want to stay to receive walker here  Daughter left phone number and will come back to  walker if delivered to unit   She also left messages for CM regarding w/c

## 2023-04-01 NOTE — ASSESSMENT & PLAN NOTE
· Lower extremity venous duplex: with bilateral acute DVTs   -Acute R DVT to popliteal and posterior tibial    -Acute L DVT in gastrocnemius vein   · Discontinue lower extremity SCDs  · Heparin drip transitioned to oral Eliquis

## 2023-04-03 ENCOUNTER — TELEPHONE (OUTPATIENT)
Dept: PULMONOLOGY | Facility: CLINIC | Age: 88
End: 2023-04-03

## 2023-04-03 LAB
BACTERIA BLD CULT: NORMAL
BACTERIA BLD CULT: NORMAL

## 2023-04-03 NOTE — UTILIZATION REVIEW
NOTIFICATION OF ADMISSION DISCHARGE   This is a Notification of Discharge from [facility]  Please be advised that this patient has been discharge from our facility  Below you will find the admission and discharge date and time including the patient’s disposition  UTILIZATION REVIEW CONTACT:  Leni Venegas  Utilization   Network Utilization Review Department  Phone: [phone] x carefully listen to the prompts  All voicemails are confidential   Email: [email or web address]     ADMISSION INFORMATION  PRESENTATION DATE: 3/29/2023 11:59 AM  OBERVATION ADMISSION DATE: INPATIENT ADMISSION DATE: 3/29/23  5:01 PM   DISCHARGE DATE: 4/1/2023  2:56 PM   DISPOSITION:Home/Self Care    IMPORTANT INFORMATION:  Send all requests for admission clinical reviews, approved or denied determinations and any other requests to dedicated fax number below belonging to the campus where the patient is receiving treatment   List of dedicated fax numbers:  [address] DENIALS (Administrative/Medical Necessity) [phone]   [address] (Maternity/NICU/Pediatrics) [phone]   [facility] [phone]   [facility] [phone]   [address] [phone]   [address] [facility] [phone]   [address] [place] [address] [phone]   [facility] [address] [phone]   [facility]  [phone]   [facility] [facility] [phone]   [address] [facility] [address] [facility] [phone]

## 2023-04-03 NOTE — TELEPHONE ENCOUNTER
Patients daughter Johnathan Heimlich called, Kylee Garcia was in the hospital and before discharging him they didn't want him to go home and have him do stairs on alone  She wasn't sure if that meant while she is there is he not aloud to do the stairs at all? Or is he able if he has the assistance?  Please advise

## 2023-04-03 NOTE — TELEPHONE ENCOUNTER
Does not appear that pulmonary has placed any limitations on him walking up the stairs it does look like that he was recommended with outpatient rehab-I would defer to outpatient PT to evaluate his ability on the stairs

## 2023-04-04 LAB
APTT HEX PL PPP: 11 SEC (ref 0–11)
APTT SCREEN TO CONFIRM RATIO: 0.87 RATIO (ref 0–1.34)
APTT-LA IMM 4:1 NP PPP: 46.3 SEC (ref 0–40.5)
AT III ACT/NOR PPP CHRO: 114 % (ref 75–135)
CONFIRM APTT/NORMAL: 55.6 SEC (ref 0–47.6)
DRVVT IMM 1:2 NP PPP: 46 SEC (ref 0–40.4)
DRVVT SCREEN TO CONFIRM RATIO: 1.1 RATIO (ref 0.8–1.2)
F2 GENE MUT ANL BLD/T: NORMAL
F5 GENE MUT ANL BLD/T: NORMAL
LA PPP-IMP: ABNORMAL
PROT C ACT/NOR PPP: 83 % (ref 73–180)
PROT S ACT/NOR PPP: 102 % (ref 63–140)
SCREEN APTT: 50.2 SEC (ref 0–43.5)
SCREEN DRVVT: 51.8 SEC (ref 0–47)
THROMBIN TIME: >150 SEC (ref 0–23)
TT IMM NP PPP: >150 SEC (ref 0–23)
TT P HPASE PPP: 19.5 SEC (ref 0–23)

## 2023-04-07 ENCOUNTER — OFFICE VISIT (OUTPATIENT)
Dept: FAMILY MEDICINE CLINIC | Facility: CLINIC | Age: 88
End: 2023-04-07

## 2023-04-07 VITALS
WEIGHT: 194 LBS | BODY MASS INDEX: 32.32 KG/M2 | RESPIRATION RATE: 18 BRPM | HEART RATE: 65 BPM | HEIGHT: 65 IN | OXYGEN SATURATION: 98 % | SYSTOLIC BLOOD PRESSURE: 110 MMHG | DIASTOLIC BLOOD PRESSURE: 60 MMHG | TEMPERATURE: 97.7 F

## 2023-04-07 DIAGNOSIS — I26.99 OTHER ACUTE PULMONARY EMBOLISM WITHOUT ACUTE COR PULMONALE (HCC): ICD-10-CM

## 2023-04-07 DIAGNOSIS — I10 PRIMARY HYPERTENSION: Primary | ICD-10-CM

## 2023-04-07 RX ORDER — PANTOPRAZOLE SODIUM 40 MG/1
1 TABLET, DELAYED RELEASE ORAL DAILY PRN
COMMUNITY

## 2023-04-07 RX ORDER — ALBUTEROL SULFATE 90 UG/1
2 AEROSOL, METERED RESPIRATORY (INHALATION)
COMMUNITY
Start: 2023-03-27 | End: 2024-03-26

## 2023-04-07 RX ORDER — CLOPIDOGREL BISULFATE 75 MG/1
TABLET ORAL
COMMUNITY
End: 2023-04-07 | Stop reason: ALTCHOICE

## 2023-04-07 RX ORDER — CIPROFLOXACIN 250 MG/1
TABLET, FILM COATED ORAL
COMMUNITY
End: 2023-04-07 | Stop reason: ALTCHOICE

## 2023-04-07 RX ORDER — ATORVASTATIN CALCIUM 40 MG/1
40 TABLET, FILM COATED ORAL
COMMUNITY
End: 2023-04-07 | Stop reason: ALTCHOICE

## 2023-04-07 RX ORDER — METOPROLOL SUCCINATE 25 MG/1
12.5 TABLET, EXTENDED RELEASE ORAL DAILY
Qty: 90 TABLET | Refills: 0 | Status: SHIPPED | OUTPATIENT
Start: 2023-04-07

## 2023-04-07 RX ORDER — LISINOPRIL 2.5 MG/1
TABLET ORAL
COMMUNITY
End: 2023-04-07 | Stop reason: ALTCHOICE

## 2023-04-07 RX ORDER — METOPROLOL SUCCINATE 25 MG/1
25 TABLET, EXTENDED RELEASE ORAL
COMMUNITY
End: 2023-04-07 | Stop reason: ALTCHOICE

## 2023-04-07 RX ORDER — PIROXICAM 10 MG/1
CAPSULE ORAL
COMMUNITY
End: 2023-04-07 | Stop reason: ALTCHOICE

## 2023-04-07 RX ORDER — PREDNISOLONE ACETATE 10 MG/ML
SUSPENSION/ DROPS OPHTHALMIC
COMMUNITY
End: 2023-04-07 | Stop reason: ALTCHOICE

## 2023-04-07 NOTE — ASSESSMENT & PLAN NOTE
He will start physical therapy continue Eliquis  That was the first episode so most likely he will need therapy for at least 6 months  He will follow-up with us in 4 weeks

## 2023-04-07 NOTE — PROGRESS NOTES
Assessment/Plan:    Acute pulmonary embolism without acute cor pulmonale ([address] )  He will start physical therapy continue Eliquis  That was the first episode so most likely he will need therapy for at least 6 months  He will follow-up with us in 4 weeks  Hypertension  Blood pressure rechecked by me 130/60, continue metoprolol which was changed to succinate, he will continue 12 5 mg daily  Diagnoses and all orders for this visit:    Primary hypertension  -     metoprolol succinate (TOPROL-XL) 25 mg 24 hr tablet; Take 0 5 tablets (12 5 mg total) by mouth daily    Other acute pulmonary embolism without acute cor pulmonale (HCC)    Other orders  -     albuterol (PROVENTIL HFA,VENTOLIN HFA) 90 mcg/act inhaler; Inhale 2 puffs  -     Discontinue: Aspirin-Calcium Carbonate  MG TABS; Take by mouth (Patient not taking: Reported on 4/7/2023)  -     Discontinue: atorvastatin (LIPITOR) 40 mg tablet; Take 40 mg by mouth (Patient not taking: Reported on 4/7/2023)  -     Discontinue: Cholecalciferol 250 MCG (87969 UT) TABS; Take by mouth (Patient not taking: Reported on 4/7/2023)  -     Discontinue: ciprofloxacin (CIPRO) 250 mg tablet; Cipro 250 mg tablet   Take 1 tablet every 12 hours by oral route  (Patient not taking: Reported on 4/7/2023)  -     Discontinue: clopidogrel (PLAVIX) 75 mg tablet; Take by mouth  -     Discontinue: lisinopril (ZESTRIL) 2 5 mg tablet; Take by mouth (Patient not taking: Reported on 4/7/2023)  -     Discontinue: metoprolol succinate (TOPROL-XL) 25 mg 24 hr tablet; Take 25 mg by mouth  -     pantoprazole (PROTONIX) 40 mg tablet; Take 1 tablet by mouth daily as needed  -     Discontinue: piroxicam (FELDENE) 10 mg capsule; Take by mouth (Patient not taking: Reported on 4/7/2023)  -     Discontinue: prednisoLONE acetate (PRED FORTE) 1 % ophthalmic suspension;  (Patient not taking: Reported on 4/7/2023)          Subjective:      Patient ID: Debbie Mathis is a 80 y o  male      Patient came today "for follow-up after his recent hospital admission due to bilateral PE, he was treated with heparin and then switched to Eliquis  Looks like recently had a COVID-19 infection most likely this DVT was provoked  His vital signs are okay today, blood pressure rechecked by me 130/60  She is permanently resides in [place] but right now he lives at his daughter house  Denies any active complaints today  The following portions of the patient's history were reviewed and updated as appropriate: allergies, current medications, past family history, past medical history, past social history, past surgical history, and problem list     Review of Systems   Constitutional: Negative for chills, fatigue and fever  Respiratory: Positive for shortness of breath (Exertional)  Negative for cough and chest tightness  Cardiovascular: Negative for chest pain, palpitations and leg swelling  Gastrointestinal: Negative for abdominal distention, abdominal pain, blood in stool, constipation, diarrhea and nausea  Genitourinary: Negative for difficulty urinating and dysuria  Musculoskeletal: Negative for arthralgias, back pain, gait problem, joint swelling, myalgias and neck pain  Skin: Negative for rash  Neurological: Negative for dizziness, weakness, numbness and headaches  Psychiatric/Behavioral: Negative for agitation           Objective:      /60 (BP Location: Left arm, Cuff Size: Large)   Pulse 65   Temp 97 7 °F (36 5 °C) (Tympanic)   Resp 18   Ht 5' 5\" (1 651 m)   Wt 88 kg (194 lb)   SpO2 98%   BMI 32 28 kg/m²     Allergies   Allergen Reactions   • Cefaclor Other (See Comments)   • Ibuprofen Other (See Comments)   • Cephalosporins Rash   • Penicillins Rash   • Sulfa Antibiotics Other (See Comments) and Rash     Rash - takes flomax            Current Outpatient Medications:   •  albuterol (PROVENTIL HFA,VENTOLIN HFA) 90 mcg/act inhaler, Inhale 2 puffs, Disp: , Rfl:   •  apixaban (Eliquis) 5 mg, " Take 2 tablets (10 mg total) by mouth 2 (two) times a day for 7 days, THEN 1 tablet (5 mg total) 2 (two) times a day for 23 days  , Disp: 74 tablet, Rfl: 0  •  atorvastatin (LIPITOR) 20 mg tablet, Take 20 mg by mouth daily, Disp: , Rfl:   •  Cholecalciferol 50 MCG (2000 UT) CAPS, Take 1 capsule by mouth daily with lunch, Disp: , Rfl:   •  metoprolol succinate (TOPROL-XL) 25 mg 24 hr tablet, Take 0 5 tablets (12 5 mg total) by mouth daily, Disp: 90 tablet, Rfl: 0  •  Multiple Vitamin (MULTIVITAMIN PO), Take 1 capsule by mouth daily, Disp: , Rfl:   •  Omega 3 1200 MG CAPS, Take 1,200 mg by mouth in the morning, Disp: , Rfl:   •  pantoprazole (PROTONIX) 40 mg tablet, Take 1 tablet by mouth daily as needed, Disp: , Rfl:   •  pantoprazole (PROTONIX) 40 mg, Take 40 mg by mouth in the morning, Disp: , Rfl:      There are no Patient Instructions on file for this visit  Physical Exam  Vitals reviewed  Constitutional:       Appearance: He is not ill-appearing  HENT:      Head: Normocephalic and atraumatic  Eyes:      Pupils: Pupils are equal, round, and reactive to light  Cardiovascular:      Rate and Rhythm: Normal rate and regular rhythm  Pulses: Normal pulses  Heart sounds: Normal heart sounds  No murmur heard  No friction rub  No gallop  Pulmonary:      Effort: Pulmonary effort is normal  No respiratory distress  Breath sounds: Normal breath sounds  No wheezing or rales  Musculoskeletal:      Cervical back: No rigidity  No muscular tenderness  Neurological:      Mental Status: He is alert and oriented to person, place, and time     Psychiatric:         Mood and Affect: Mood normal          Behavior: Behavior normal

## 2023-04-24 ENCOUNTER — TELEPHONE (OUTPATIENT)
Dept: PULMONOLOGY | Facility: CLINIC | Age: 88
End: 2023-04-24

## 2023-04-24 ENCOUNTER — OFFICE VISIT (OUTPATIENT)
Dept: PHYSICAL THERAPY | Facility: REHABILITATION | Age: 88
End: 2023-04-24

## 2023-04-24 DIAGNOSIS — I82.403 LEG DVT (DEEP VENOUS THROMBOEMBOLISM), ACUTE, BILATERAL (HCC): Primary | ICD-10-CM

## 2023-04-24 DIAGNOSIS — I26.99 BILATERAL PULMONARY EMBOLISM (HCC): ICD-10-CM

## 2023-04-24 DIAGNOSIS — R26.89 BALANCE PROBLEM: Primary | ICD-10-CM

## 2023-04-24 NOTE — PROGRESS NOTES
"Daily Note     Today's date: 2023  Patient name: Julia Rocha  : 1935  MRN: [id number]  Referring provider: Jenny Salguero*  Dx:   Encounter Diagnosis     ICD-10-CM    1  Balance problem  R26 89       2  Bilateral pulmonary embolism ([address] )  I26 99                       Session performed by SPT Wen De La Fuente, under direct supervision of DPT Jian Franco  Subjective: Pt and daughter report they were walking a lot this weekend doing a lot of shopping  Pt reports he \"feels better\" using the cane around his daughter's home and going up and down the steps  Objective: See treatment diary below  HR: 67  BP: 126/58  SpO2: 98    Assessment: Pt demonstrated an improved aerobic capacity today(no standing rest on treadmill, shorter rests during stairs) consistent w/ his reports of regular PA participation @ home  Pt has improved his confidence w/ stair navigatoin w/ his SPC evident by his willingness to descend reciprocally  Pt continues to rely on U/E support to maintain balance due to diminished righting reactions  Pt would continue to benefit from skilled PT to address deficits in aerobic endurance and balance impacting his ability to safely return to live independently in his [place] home  Plan: Continue challenging stairs, and overground gait w/ SPC  Introduce compliant surface ambulation  2x per week for 8 weeks        Precautions: B/L Pulmonary Embolism, B/L LE DVTs, R-eyed Blindness    HEP: NBOS EO/EC  Manuals 4/10 4/17 4/20 4/24                                                        Neuro Re-Ed            NBOS   4 x 30 sec EO  2 x 20 sec EC   4 x 30 sec EO  2 x 20 sec EC        Semi-Tandem    3 x 20sec EO                                                                    Ther Ex            STS  2 x 15  W/ 11lb med ball press                                                                                              Ther Activity                                    Gait Training     " Treadmill   5% incline w/ B/L U/E  1 4 mph    10min   5% incline w/ B/L U/E  1 6 mph    10min        Stairs  W/ SPC    4 x 1/2 flight  W/ SPC    Full flight / 2 W/ SPC    Full flight x3        Overground Walking   FWD/Lat/BKWD w/ SPC     4in curb    3 x 200ft     500 ft during session 500 ft during session w/ 10 STS no U/E        Patient Ed             10 min  -POC 10 min  -SPC use safet  -Household ambulation w/ SPC, community ambulation w/ RW  -Hydration 10 min  -HEP  -Increasing Daily PA  -Deep breathing

## 2023-04-24 NOTE — TELEPHONE ENCOUNTER
Patients daughter Magda Montes called, she is asking if we could send in a refill of his Eliquis  She wasn't sure who she should be asking  It seems at his visit with Tabby Caridad on 4/13 she advised him to continue on Eliquis for 3-6 months  He only had a script from the hospital for a starter pack and will be done with this script come May 1st  She is asking for a new script to be sent to his [employer]  She also wasn't sure if there were any coupons in the office for Eliquis or if good may have them  She wasn't sure how costly it was going to be   Please advise

## 2023-04-25 ENCOUNTER — TELEPHONE (OUTPATIENT)
Dept: PULMONOLOGY | Facility: CLINIC | Age: 88
End: 2023-04-25

## 2023-04-25 DIAGNOSIS — I82.403 LEG DVT (DEEP VENOUS THROMBOEMBOLISM), ACUTE, BILATERAL (HCC): Primary | ICD-10-CM

## 2023-04-25 NOTE — TELEPHONE ENCOUNTER
Patients daughter is calling and asking if it would be possible to order a blood test for her dad due to him having blood clots  She also wants to know, how would she know if the eloquis medication is working for him  She asked if Anali Babcock could call her about this or send her a message through [address]  Please advise

## 2023-04-25 NOTE — TELEPHONE ENCOUNTER
Left VM for daughter: Blood thinner will avoid clot getting bigger he Christina Boots on eliquis for 3 months at which time he will obtain repeat echo and ddimer

## 2023-04-27 ENCOUNTER — OFFICE VISIT (OUTPATIENT)
Dept: PHYSICAL THERAPY | Facility: REHABILITATION | Age: 88
End: 2023-04-27

## 2023-04-27 DIAGNOSIS — R26.89 BALANCE PROBLEM: Primary | ICD-10-CM

## 2023-04-27 DIAGNOSIS — I26.99 BILATERAL PULMONARY EMBOLISM (HCC): ICD-10-CM

## 2023-04-27 NOTE — PROGRESS NOTES
"Daily Note     Today's date: 2023  Patient name: Mine Hassan  : 1935  MRN: [id number]  Referring provider: Sydnie Harman*  Dx:   Encounter Diagnosis     ICD-10-CM    1  Balance problem  R26 89       2  Bilateral pulmonary embolism ([address] )  I26 99                       Session performed by SPT Ilya Alexander, under direct supervision of DPT Romina Estrada  Subjective: Pt notes he has been \"walking around more often\" and feels \"fine\" using the 6 steps in his daughter's home  Pt's daughter notes he hasn't been compliant w/ his balance HEP unless she's with him  Objective:   HR: 66  BP: 134/78    Assessment: Pt continues to improve his aerobic activity tolerance is able to exercise for longer bouts w/ reduced frequency of rest-breaks  Pt was willing to use the SOLO harness to work on overground dynamic balance w/o his SPC  Pt required a hand-held assist and mod verbal encouragement to navigate on compliant surfaces and steps due to his reduced balance confidence  Pt would continue to benefit from skilled PT to address deficits in aerobic endurance and dynamic balance to reduce his risk of suffering a fall  Plan: Continue challenging stairs, and overground gait w/ SPC  Introduce compliant surface ambulation  2x per week for 8 weeks        Precautions: B/L Pulmonary Embolism, B/L LE DVTs, R-eyed Blindness    HEP: NBOS EO/EC  Manuals 4/10 4/17 4/20 4/24 4/27                                                       Neuro Re-Ed            NBOS   4 x 30 sec EO  2 x 20 sec EC   4 x 30 sec EO  2 x 20 sec EC        Semi-Tandem    3 x 20sec EO                                                                    Ther Ex            STS  2 x 15  W/ 11lb med ball press          Reactive Stepping                                                                                    Ther Activity            Hurdles     2 in w/ folded up mat    5 x 40ft       Compliant Surface     Walk on folded up mat and " compliant ramp w/ hand-held assist       STS     2 x 15  W/ 11lb med ball press standing on airex        Gait Training            Treadmill   5% incline w/ B/L U/E  1 4 mph    10min   5% incline w/ B/L U/E  1 6 mph    10min 8% incline w/ B/L U/E  1 6 mph    10min         Stairs  W/ SPC    4 x 1/2 flight  W/ SPC    Full flight / 2 W/ SPC    Full flight x3 W/ SPC    Full flight x2       Overground Walking   FWD/Lat/BKWD w/ SPC     4in curb    3 x 200ft     500 ft during session 500 ft during session w/ 10 STS no U/E        Patient Ed             10 min  -POC 10 min  -SPC use safet  -Household ambulation w/ SPC, community ambulation w/ RW  -Hydration 10 min  -HEP  -Increasing Daily PA  -Deep breathing

## 2023-05-01 ENCOUNTER — OFFICE VISIT (OUTPATIENT)
Dept: PHYSICAL THERAPY | Facility: REHABILITATION | Age: 88
End: 2023-05-01

## 2023-05-01 DIAGNOSIS — I26.99 BILATERAL PULMONARY EMBOLISM (HCC): ICD-10-CM

## 2023-05-01 DIAGNOSIS — R26.89 BALANCE PROBLEM: Primary | ICD-10-CM

## 2023-05-01 NOTE — PROGRESS NOTES
Daily Note     Today's date: 2023  Patient name: Milton Melissa  : 1935  MRN: [id number]  Referring provider: Jeannine Vences*  Dx:   Encounter Diagnosis     ICD-10-CM    1  Balance problem  R26 89       2  Bilateral pulmonary embolism ([address] )  I26 99                       Session performed by IVONNE Enriquez, under direct supervision of DPT Wing End  Subjective: Pt and daughter notes he has not done much walking since last visit due to L knee pain  Objective:   HR: 68  BP: 152/78  SpO2: 97%    Assessment: L knee pain was not exacerbated during today's session  Pt continues to progress in his functional activity tolerance 2/2 his gains in aerobic capacity, requiring less rest breaks throughout the session  Pt continues to require hand-held assist and SOLO harness to maintain safety w/ overground ambulation and compliant surface w/o an AD due to diminished righting-reactions and lack of confidence in his balance  Pt and daughter were educated on daily step tracking and goal setting to increase Pt's daily PA participation, progression of the POC, and developing a plan for Pt to safely return to his [facility]  Pt would continue to benefit from skilled PT to address deficits in aerobic endurance and balance to increase is functional independence  Plan: Continue challenging stairs, and overground gait w/ SPC  Introduce compliant surface ambulation  2x per week for 8 weeks        Precautions: B/L Pulmonary Embolism, B/L LE DVTs, R-eyed Blindness    HEP: NBOS EO/EC  Manuals 4/10 4/17 4/20 4/24 4/27 5/1                                                      Neuro Re-Ed            NBOS   4 x 30 sec EO  2 x 20 sec EC   4 x 30 sec EO  2 x 20 sec EC        Semi-Tandem    3 x 20sec EO                                                                    Ther Ex            STS  2 x 15  W/ 11lb med ball press          Reactive Stepping Ther Activity            Hurdles     2 in w/ folded up mat    5 x 40ft       Compliant Surface     Walk on folded up mat and compliant ramp w/ hand-held assist Ramp w/ hand-held assist  100 ft walking w/o assist    x5      STS     2 x 15  W/ 11lb med ball press standing on airex        Gait Training            Treadmill   5% incline w/ B/L U/E  1 4 mph    10min   5% incline w/ B/L U/E  1 6 mph    10min 8% incline w/ B/L U/E  1 6 mph    10min   5% incline w/ B/L U/E 1 6 mph     10min      Stairs  W/ SPC    4 x 1/2 flight  W/ SPC    Full flight / 2 W/ SPC    Full flight x3 W/ SPC    Full flight x2 W/ SPC    Full flight x3      Overground Walking   FWD/Lat/BKWD w/ SPC     4in curb    3 x 200ft     500 ft during session 500 ft during session w/ 10 STS no U/E        Patient Ed             10 min  -POC 10 min  -SPC use safet  -Household ambulation w/ SPC, community ambulation w/ RW  -Hydration 10 min  -HEP  -Increasing Daily PA  -Deep breathing    15 min  -Step tracking goals  -POC progression  -Increasing daily PA  -Returning to [address] [facility]

## 2023-05-04 ENCOUNTER — OFFICE VISIT (OUTPATIENT)
Dept: PHYSICAL THERAPY | Facility: REHABILITATION | Age: 88
End: 2023-05-04

## 2023-05-04 DIAGNOSIS — R26.89 BALANCE PROBLEM: Primary | ICD-10-CM

## 2023-05-04 DIAGNOSIS — I26.99 BILATERAL PULMONARY EMBOLISM (HCC): ICD-10-CM

## 2023-05-04 NOTE — PROGRESS NOTES
Daily Note     Today's date: 2023  Patient name: Tacho Gurrola  : 1935  MRN: [id number]  Referring provider: Yeison Zaragoza*  Dx:   Encounter Diagnosis     ICD-10-CM    1  Balance problem  R26 89       2  Bilateral pulmonary embolism ([address] )  I26 99           Start Time: 1045  Stop Time: 1130  Total time in clinic (min): 45 minutes     Session performed by IVONNE Chavez, under direct supervision of DPTANNER Altman Se  Subjective: Pt denies any complaints of L knee pain  Pt and daughter report that he only got in ~100 steps yesterday and would like to set a daily step goal together  Objective:   HR: 66   BP: 148/70    Assessment: Pt continues to progress in his aerobic activity tolerance, but does struggle w/ PA program adherence @ home as per daughter's reports  Pt required mod verbal/visual cues to initiate larger steps during agility ladder drills due to his habit of ambulating w/ reduced step-length and a NBOS correlating w/ his reduced balance confidence  Pt continues to require hand-held assist and SOLO to maintain safety w/ AD-free overground ambulation on/off a compliant surface due to deficits in dynamic balance and diminished righting reactions  Pt would continue to benefit from skilled PT to address the aforementioned deficits to improve his functional independence  Plan: Continue challenging stairs, and overground gait w/ SPC  Introduce compliant surface ambulation  2x per week for 8 weeks        Precautions: B/L Pulmonary Embolism, B/L LE DVTs, R-eyed Blindness    HEP: NBOS EO/EC  Manuals 4/10 4/17 4/20 4/24 4/27 5/1 5/4                                                     Neuro Re-Ed            NBOS   4 x 30 sec EO  2 x 20 sec EC   4 x 30 sec EO  2 x 20 sec EC        Semi-Tandem    3 x 20sec EO                                                                    Ther Ex            STS  2 x 15  W/ 11lb med ball press          Reactive Stepping Ther Activity            Hurdles     2 in w/ folded up mat    5 x 40ft       Compliant Surface     Walk on folded up mat and compliant ramp w/ hand-held assist Ramp w/ hand-held assist  100 ft walking w/o assist    x5 Mat w/ objects underneath -> compliant ramp w/ handheld assist   FWD/Lat x4 each     Agility ladder       Fwd diagnals x3 laps    Lat x3 laps      STS     2 x 15  W/ 11lb med ball press standing on airex        Gait Training            Treadmill   5% incline w/ B/L U/E  1 4 mph    10min   5% incline w/ B/L U/E  1 6 mph    10min 8% incline w/ B/L U/E  1 6 mph    10min   5% incline w/ B/L U/E 1 6 mph     10min 5% incline w/ B/L UE 1 8 mph    10 min     Stairs  W/ SPC    4 x 1/2 flight  W/ SPC    Full flight / 2 W/ SPC    Full flight x3 W/ SPC    Full flight x2 W/ SPC    Full flight x3 W/ SPC    Full flight x 1  Ascend step-over-step     Overground Walking   FWD/Lat/BKWD w/ SPC     4in curb    3 x 200ft     500 ft during session 500 ft during session w/ 10 STS no U/E        Patient Ed             10 min  -POC 10 min  -SPC use safet  -Household ambulation w/ SPC, community ambulation w/ RW  -Hydration 10 min  -HEP  -Increasing Daily PA  -Deep breathing    15 min  -Step tracking goals  -POC progression  -Increasing daily PA  [facility] [address]

## 2023-05-08 ENCOUNTER — OFFICE VISIT (OUTPATIENT)
Dept: PHYSICAL THERAPY | Facility: REHABILITATION | Age: 88
End: 2023-05-08

## 2023-05-08 ENCOUNTER — OFFICE VISIT (OUTPATIENT)
Dept: FAMILY MEDICINE CLINIC | Facility: CLINIC | Age: 88
End: 2023-05-08

## 2023-05-08 VITALS
HEART RATE: 75 BPM | SYSTOLIC BLOOD PRESSURE: 130 MMHG | WEIGHT: 198 LBS | TEMPERATURE: 98.3 F | OXYGEN SATURATION: 97 % | HEIGHT: 65 IN | RESPIRATION RATE: 14 BRPM | DIASTOLIC BLOOD PRESSURE: 58 MMHG | BODY MASS INDEX: 32.99 KG/M2

## 2023-05-08 DIAGNOSIS — R26.89 BALANCE PROBLEM: Primary | ICD-10-CM

## 2023-05-08 DIAGNOSIS — I26.99 OTHER ACUTE PULMONARY EMBOLISM WITHOUT ACUTE COR PULMONALE (HCC): ICD-10-CM

## 2023-05-08 DIAGNOSIS — I82.403 LEG DVT (DEEP VENOUS THROMBOEMBOLISM), ACUTE, BILATERAL (HCC): ICD-10-CM

## 2023-05-08 DIAGNOSIS — R26.2 AMBULATORY DYSFUNCTION: Primary | ICD-10-CM

## 2023-05-08 DIAGNOSIS — I26.99 BILATERAL PULMONARY EMBOLISM (HCC): ICD-10-CM

## 2023-05-08 DIAGNOSIS — I10 PRIMARY HYPERTENSION: ICD-10-CM

## 2023-05-08 NOTE — PROGRESS NOTES
"Daily Note     Today's date: 2023  Patient name: Cassie Mei  : 1935  MRN: [id number]  Referring provider: Naheed Morgan*  Dx:   Encounter Diagnosis     ICD-10-CM    1  Balance problem  R26 89       2  Bilateral pulmonary embolism ([address] )  I26 99                     Session performed by SPT Camryn Borden, under direct supervision of DPT Jacinta Castleman  Subjective: Pt notes that he and his daughter and \"got in a lot of walking\" this weekend, averaged \"530 steps\" a day since last session  Objective:   HR: 70   BP: 138/62    Assessment: Pt required mod verbal/visual cuing to encourage large steps outside of his RETA due to decreased balance confidence  Pt continues to require hand-held assist and SOLO to safely execute dynamic balance TA due to diminished righting reactions and poor awareness of his limits of stability  Pt would continue to benefit from skilled PT to address deficits in aerobic endurance and balance to improve his functional independence to be able to safely return to his [place] home  Plan: Continue challenging stairs, and overground gait w/ SPC  Introduce compliant surface ambulation  2x per week for 8 weeks        Precautions: B/L Pulmonary Embolism, B/L LE DVTs, R-eyed Blindness    HEP: NBOS EO/EC  Manuals 4/10 4/17 4/20 4/24 4/27 5/1 5/4 5/8                                                    Neuro Re-Ed            NBOS   4 x 30 sec EO  2 x 20 sec EC   4 x 30 sec EO  2 x 20 sec EC        Semi-Tandem    3 x 20sec EO                                                                    Ther Ex            STS  2 x 15  W/ 11lb med ball press          Reactive Stepping                                                                                    Ther Activity            Hurdles     2 in w/ folded up mat    5 x 40ft       Compliant Surface     Walk on folded up mat and compliant ramp w/ hand-held assist Ramp w/ hand-held assist  100 ft walking w/o assist    x5 Mat " w/ objects underneath -> compliant ramp w/ handheld assist   FWD/Lat x4 each 6 in hurdles -> folded up mat -> compliant ramp w/ handheld assist  FWD only x 6    Agility ladder       Fwd diagnals x3 laps    Lat x3 laps Fwd diagnals x4 laps    Lat x3 laps     STS     2 x 15  W/ 11lb med ball press standing on airex        Gait Training            Treadmill   5% incline w/ B/L U/E  1 4 mph    10min   5% incline w/ B/L U/E  1 6 mph    10min 8% incline w/ B/L U/E  1 6 mph    10min   5% incline w/ B/L U/E 1 6 mph     10min 5% incline w/ B/L UE 1 8 mph    10 min 5% incline w/ B/L UE 1 8 mph  10 min    Stairs  W/ SPC    4 x 1/2 flight  W/ SPC    Full flight / 2 W/ SPC    Full flight x3 W/ SPC    Full flight x2 W/ SPC    Full flight x3 W/ SPC    Full flight x 1  Ascend step-over-step     Overground Walking   FWD/Lat/BKWD w/ SPC     4in curb    3 x 200ft     500 ft during session 500 ft during session w/ 10 STS no U/E        Patient Ed             10 min  -POC 10 min  -SPC use safet  -Household ambulation w/ SPC, community ambulation w/ RW  -Hydration 10 min  -HEP  -Increasing Daily PA  -Deep breathing    15 min  -Step tracking goals  -POC progression  -Increasing daily PA  -Returning to [address] [facility]

## 2023-05-08 NOTE — PROGRESS NOTES
"Assessment/Plan:    Hypertension  Very well controlled on current management with metoprolol  Continue the same  Acute pulmonary embolism without acute cor pulmonale ([address] )  He followed up with pulmonary service, he will continue Eliquis up to 6 months  Hes follow-up echo is scheduled  Ambulatory dysfunction  Continue physical therapy, he ambulates safely with a walker  Diagnoses and all orders for this visit:    Ambulatory dysfunction    Leg DVT (deep venous thromboembolism), acute, bilateral ([address] )    Primary hypertension    Other acute pulmonary embolism without acute cor pulmonale (HCC)          Subjective:      Patient ID: Manoj Matos is a 80 y o  male  Patient came today for follow-up on his chronic problems including hypertension, recent history of pulmonary embolism  The following portions of the patient's history were reviewed and updated as appropriate: allergies, current medications, past family history, past medical history, past social history, past surgical history, and problem list     Review of Systems   Constitutional: Negative for chills, fatigue and fever  Respiratory: Positive for shortness of breath (Exertional, improved)  Negative for cough and chest tightness  Cardiovascular: Negative for chest pain, palpitations and leg swelling  Gastrointestinal: Negative for abdominal distention, abdominal pain, blood in stool, constipation, diarrhea and nausea  Genitourinary: Negative for difficulty urinating and dysuria  Musculoskeletal: Negative for arthralgias, back pain, gait problem, joint swelling, myalgias and neck pain  Skin: Negative for rash  Neurological: Negative for dizziness, weakness, numbness and headaches  Psychiatric/Behavioral: Negative for agitation           Objective:      /58 (BP Location: Left arm, Patient Position: Sitting, Cuff Size: Large)   Pulse 75   Temp 98 3 °F (36 8 °C) (Tympanic)   Resp 14   Ht 5' 5\" (1 651 m)   Wt 89 8 kg (198 " lb)   SpO2 97%   BMI 32 95 kg/m²     Allergies   Allergen Reactions   • Cefaclor Other (See Comments)   • Ibuprofen Other (See Comments)   • Cephalosporins Rash   • Penicillins Rash   • Sulfa Antibiotics Other (See Comments) and Rash     Rash - takes flomax            Current Outpatient Medications:   •  apixaban (Eliquis) 5 mg, Take 1 tablet (5 mg total) by mouth 2 (two) times a day, Disp: 30 tablet, Rfl: 3  •  atorvastatin (LIPITOR) 20 mg tablet, Take 20 mg by mouth daily, Disp: , Rfl:   •  Cholecalciferol 50 MCG (2000 UT) CAPS, Take 1 capsule by mouth daily with lunch, Disp: , Rfl:   •  metoprolol succinate (TOPROL-XL) 25 mg 24 hr tablet, Take 0 5 tablets (12 5 mg total) by mouth daily, Disp: 90 tablet, Rfl: 0  •  Multiple Vitamin (MULTIVITAMIN PO), Take 1 capsule by mouth daily, Disp: , Rfl:   •  Omega 3 1200 MG CAPS, Take 1,200 mg by mouth in the morning, Disp: , Rfl:   •  pantoprazole (PROTONIX) 40 mg, Take 40 mg by mouth in the morning, Disp: , Rfl:   •  apixaban (Eliquis) 5 mg, Take 2 tablets (10 mg total) by mouth 2 (two) times a day for 7 days, THEN 1 tablet (5 mg total) 2 (two) times a day for 23 days  , Disp: 74 tablet, Rfl: 0     There are no Patient Instructions on file for this visit  Physical Exam  Vitals reviewed  Constitutional:       General: He is not in acute distress  Appearance: He is not ill-appearing or toxic-appearing  HENT:      Head: Normocephalic and atraumatic  Eyes:      Pupils: Pupils are equal, round, and reactive to light  Cardiovascular:      Rate and Rhythm: Normal rate and regular rhythm  Pulses: Normal pulses  Heart sounds: Normal heart sounds  No murmur heard  No friction rub  No gallop  Pulmonary:      Effort: Pulmonary effort is normal  No respiratory distress  Breath sounds: Normal breath sounds  No wheezing or rales  Musculoskeletal:      Cervical back: No rigidity  No muscular tenderness     Neurological:      Mental Status: He is alert and oriented to person, place, and time     Psychiatric:         Mood and Affect: Mood normal          Behavior: Behavior normal

## 2023-05-08 NOTE — ASSESSMENT & PLAN NOTE
He followed up with pulmonary service, he will continue Eliquis up to 6 months  Hes follow-up echo is scheduled

## 2023-05-11 ENCOUNTER — EVALUATION (OUTPATIENT)
Dept: PHYSICAL THERAPY | Facility: REHABILITATION | Age: 88
End: 2023-05-11

## 2023-05-11 DIAGNOSIS — R26.89 BALANCE PROBLEM: Primary | ICD-10-CM

## 2023-05-11 DIAGNOSIS — I26.99 BILATERAL PULMONARY EMBOLISM (HCC): ICD-10-CM

## 2023-05-11 NOTE — PROGRESS NOTES
PT Re-Eval  Today's date: 2023  Patient name: Kassi Bailey  : 1935  MRN: [id number]  Referring provider: Anais Falcon*  Dx:   Encounter Diagnosis     ICD-10-CM    1  Balance problem  R26 89       2  Bilateral pulmonary embolism ([address] )  I26 99                     Session performed by IVONNE Kwok, under direct supervision of DPTANNER Bonilla  Subjective: Pt reports that he has been doing more walking and feels more confident using the cane, and rarely uses the RW  [facility] notes that she would like to be staying with Pt during the weekends in [facility]  Objective:   HR: 57  BP: 150/88  SpO2: 97    Balance Test Initial Eval         5x Sit to Stand: 16 3s B/L UE support  16 8s no UE        TU 8s w/ RW  18 4s no AD  17 8 w/ SPC  14 2 no AD       Gait Speed:            2 Minute Walk Test:           6 Minute Walk Test: 800 ft w/ RW(1 rest)  975 ft w/ SPC       Ibarra Balance Scale:           FGA:  10/30  1730                                   Goals  STGs (2-4 weeks)  - Pt will self-report at least 75% adherence to HEP and home PA program to improve his functional capacity  - not met, progressing  - Pt will be able to complete a 5xSTS w/o B/L UE support to indicate a meaningful improvement in LE strength, decreasing his risk of suffering a fall  - MET  - Pt will be able to safely ascend/descend 5 steps to improve his functional independence  - MET  - () Pt will achieve an FGA of 2430 to indicate he is at a decreased risk of suffering a fall when he returns to his [place] home     - () Pt will perform a 5xSTS in 12 sec on less to indicate meaningful change in LE power so he can navigate stairs       LTGs (By D/C)  - Pt will improve his FGA score by at least 4 points to indicate a meaningful change in his dynamic balance, decreasing his risk of suffering a fall  - MET  - Pt will be able to safely ascend/descend 13 steps so he can navigate within his Fi [address] [place] [address]  - MET  - Pt will improve his TUG time w/ no AD to at least 12s to indicate a decreased risk of suffering a fall  - not met, progressing     Assessment: Pt has made signficant improvement in measures of LE power, aerobic endurance, and dynamic balance, and is able to safely ambulate/navigate stairs w/ a less restrictive AD in his SPC  Pt does continue to present as a falls risk as per his performance of the 5xSTS, TUG, and FGA, and falls below expected age-matched norms for his 6MWT performance indicating further room for improvement in his aerobic endurance  Pt and daughter were educated on exam findings and updates to the POC, as well as recommending that Pt wait to return to his [address] [place] [address] until finishing his course of PT  Pt would continue to benefit from skilled PT to address deficits in aerobic endurance and dynamic balance to improve his safety and functional independence  Plan: Continue challenging stairs, and overground gait w/ SPC  Introduce compliant surface ambulation  2x per week for 4 more weeks        Precautions: B/L Pulmonary Embolism, B/L LE DVTs, R-eyed Blindness    HEP: NBOS EO/EC  Manuals 4/10 4/17 4/20 4/24 4/27 5/1 5/4 5/8 5/11                                                   Neuro Re-Ed            NBOS   4 x 30 sec EO  2 x 20 sec EC   4 x 30 sec EO  2 x 20 sec EC        Semi-Tandem    3 x 20sec EO                                                                    Ther Ex            STS  2 x 15  W/ 11lb med ball press          Reactive Stepping                                                                                    Ther Activity            Hurdles     2 in w/ folded up mat    5 x 40ft       Compliant Surface     Walk on folded up mat and compliant ramp w/ hand-held assist Ramp w/ hand-held assist  100 ft walking w/o assist    x5 Mat w/ objects underneath -> compliant ramp w/ handheld assist   FWD/Lat x4 each 6 in hurdles -> folded up mat -> compliant ramp w/ handheld assist  FWD only x 6    Agility ladder       Fwd diagnals x3 laps    Lat x3 laps Fwd diagnals x4 laps    Lat x3 laps     STS     2 x 15  W/ 11lb med ball press standing on airex        Gait Training            Treadmill   5% incline w/ B/L U/E  1 4 mph    10min   5% incline w/ B/L U/E  1 6 mph    10min 8% incline w/ B/L U/E  1 6 mph    10min   5% incline w/ B/L U/E 1 6 mph     10min 5% incline w/ B/L UE 1 8 mph    10 min 5% incline w/ B/L UE 1 8 mph  10 min 10 min  -Exam findings and POC progression   Stairs  W/ SPC    4 x 1/2 flight  W/ SPC    Full flight / 2 W/ SPC    Full flight x3 W/ SPC    Full flight x2 W/ SPC    Full flight x3 W/ SPC    Full flight x 1  Ascend step-over-step     Overground Walking   FWD/Lat/BKWD w/ SPC     4in curb    3 x 200ft     500 ft during session 500 ft during session w/ 10 STS no U/E        Patient Ed             10 min  -POC 10 min  -SPC use safet  -Household ambulation w/ SPC, community ambulation w/ RW  -Hydration 10 min  -HEP  -Increasing Daily PA  -Deep breathing    15 min  -Step tracking goals  -POC progression  -Increasing daily PA  [facility]

## 2023-05-15 ENCOUNTER — OFFICE VISIT (OUTPATIENT)
Dept: PHYSICAL THERAPY | Facility: REHABILITATION | Age: 88
End: 2023-05-15

## 2023-05-15 DIAGNOSIS — R26.89 BALANCE PROBLEM: Primary | ICD-10-CM

## 2023-05-15 DIAGNOSIS — I26.99 BILATERAL PULMONARY EMBOLISM (HCC): ICD-10-CM

## 2023-05-15 NOTE — PROGRESS NOTES
"Daily Note     Today's date: 5/15/2023  Patient name: John Barboza  : 1935  MRN: [id number]  Referring provider: Pete Pichardo*  Dx:   Encounter Diagnosis     ICD-10-CM    1  Balance problem  R26 89       2  Bilateral pulmonary embolism ([address] )  I26 99                     Session performed by IVONNE Kyle, under direct supervision of DPT Kirsten Ng  Subjective: Pt reports that he did a lot of walking this weekend and hit a daily average of 1000 steps  Pt notes that he has been using his [facility] \"on and off\" in his daughter's home  Objective:   HR: 66   BP: 122/66    Assessment: Pt continues to progress in his activity tolerance and intensity w/ PT interventions, consistent w/ his reports of increasing his daily PA @ home  Pt continues to require SOLO and contact guard when performing overground and stair ambulation w/o AD due to diminished righting reactions/reactive-stepping, increasing his likelihood of suffering a fall  Pt demonstrates an improved balance confidence evident by his increased willingness to increase step-length w/o an AD in the SOLO  Pt would continue to benefit from skilled PT to address continued deficits in dynamic balance and aerobic endurance limiting         Plan: Continue challenging stairs, and overground gait w/ SPC  Introduce compliant surface ambulation  2x per week for 8 weeks        Precautions: B/L Pulmonary Embolism, B/L LE DVTs, R-eyed Blindness    HEP: NBOS EO/EC  Manuals 4/10 4/17 4/20 4/24 4/27 5/ 5/4 5/8 5/15                                                   Neuro Re-Ed            NBOS   4 x 30 sec EO  2 x 20 sec EC   4 x 30 sec EO  2 x 20 sec EC        Semi-Tandem    3 x 20sec EO                                                                    Ther Ex            STS  2 x 15  W/ 11lb med ball press          Reactive Stepping                                                                                    Ther Activity            Hurdles     2 in " w/ folded up mat    5 x 40ft       Walking on Uneven Ground     Walk on folded up mat and compliant ramp w/ hand-held assist Ramp w/ hand-held assist  100 ft walking w/o assist    x5 Mat w/ objects underneath -> compliant ramp w/ handheld assist   FWD/Lat x4 each 6 in hurdles -> folded up mat -> compliant ramp w/ handheld assist  FWD only x 6 Foam beams side by side -> Compliant ramp FWD/BKWD w/ hand-held assist x6    Superset w/ STS   Stepping around clutter in [employer]       Fwd diagnals x3 laps    Lat x3 laps Fwd diagnals x4 laps    Lat x3 laps Fwd big steps through ladder x 3 laps    Diagonals x3 laps   STS     2 x 15  W/ 11lb med ball press standing on airex     3 x 10 superset w/ Uneven ground walking     Last 2 w/ 11lb med ball press   Gait Training            Treadmill   5% incline w/ B/L U/E  1 4 mph    10min   5% incline w/ B/L U/E  1 6 mph    10min 8% incline w/ B/L U/E  1 6 mph    10min   5% incline w/ B/L U/E 1 6 mph     10min 5% incline w/ B/L UE 1 8 mph    10 min 5% incline w/ B/L UE 1 8 mph  10 min 8% incline  W/ B/L UE  1 6 mph  10 min   Stairs  W/ SPC    4 x 1/2 flight  W/ SPC    Full flight / 2 W/ SPC    Full flight x3 W/ SPC    Full flight x2 W/ SPC    Full flight x3 W/ SPC    Full flight x 1  Ascend step-over-step  No SPC  One Railing    3 flights step-over-step   Overground Walking   FWD/Lat/BKWD w/ SPC     4in curb    3 x 200ft     500 ft during session 500 ft during session w/ 10 STS no U/E        Patient Ed             10 min  -POC 10 min  -SPC use safet  -Household ambulation w/ SPC, community ambulation w/ RW  -Hydration 10 min  -HEP  -Increasing Daily PA  -Deep breathing    15 min  -Step tracking goals  -POC progression  -Increasing daily PA  [facility]

## 2023-05-18 ENCOUNTER — OFFICE VISIT (OUTPATIENT)
Dept: PHYSICAL THERAPY | Facility: REHABILITATION | Age: 88
End: 2023-05-18

## 2023-05-18 DIAGNOSIS — R26.89 BALANCE PROBLEM: Primary | ICD-10-CM

## 2023-05-18 DIAGNOSIS — I26.99 BILATERAL PULMONARY EMBOLISM (HCC): ICD-10-CM

## 2023-05-18 NOTE — PROGRESS NOTES
"Daily Note     Today's date: 2023  Patient name: Jyoti Cleaning  : 1935  MRN: [id number]  Referring provider: Em Gpison  Dx:   Encounter Diagnosis     ICD-10-CM    1  Balance problem  R26 89       2  Bilateral pulmonary embolism (HCC)  I26 99                   Pt was seen 1 on  from 8:30 to 9:00  Session performed by SPT Abelino Arrington, under direct supervision of DPT Aguila Gamble  Subjective: Pt reports he has been outside the past few days and \"getting some walking in\"  Pt notes his L knee is bothering him today  Objective:   HR: 56  BP: 126/68    Assessment: Pt has significantly improved his stair navigation tolerance evident by reduced duration of rest-breaks, but is limited in performing consistent step-over-step w/o single-railing due to L knee pain  Pt continues to progress in his balance confidence in overground ambulation w/o SPC (less confident in Bkwd evident by reduced step-length/slow speed), but still requires SOLO and close supervision to safely execute due to diminished righting reactions  Pt would continue to benefit from skilled PT to address deficits in aerobic endurance and dynamic balance to reduce his risk of suffering a fall  Plan: Continue challenging stairs, and overground gait w/ SPC  Introduce compliant surface ambulation  2x per week for 8 weeks        Precautions: B/L Pulmonary Embolism, B/L LE DVTs, R-eyed Blindness    HEP: NBOS EO/EC    5/ 5/4 5/8 5/15                                                   Neuro Re-Ed            NBOS   4 x 30 sec EO  2 x 20 sec EC   4 x 30 sec EO  2 x 20 sec EC        Semi-Tandem    3 x 20sec EO                                                                    Ther Ex            STS  2 x 15  W/ 11lb med ball press          Reactive Stepping                                                                                    Ther Activity            Hurdles     2 in w/ folded up mat    5 x " 40ft       Walking on Uneven Ground Fwd/Bkwd no SPC  100ft x 3    Vbmi-[id number]-[id number] foam beams -> compliant ramp w/ HHA  6 laps        Walk on folded up mat and compliant ramp w/ hand-held assist Ramp w/ hand-held assist  100 ft walking w/o assist    x5 Mat w/ objects underneath -> compliant ramp w/ handheld assist   FWD/Lat x4 each 6 in hurdles -> folded up mat -> compliant ramp w/ handheld assist  FWD only x 6 Foam beams side by side -> Compliant ramp FWD/BKWD w/ hand-held assist x6    Superset w/ STS   Picking up mail No SPC  Low cones from floor  2 x 25ft           Stepping around clutter in [employer]       Fwd diagnals x3 laps    Lat x3 laps Fwd diagnals x4 laps    Lat x3 laps Fwd big steps through ladder x 3 laps    Diagonals x3 laps   STS     2 x 15  W/ 11lb med ball press standing on airex     3 x 10 superset w/ Uneven ground walking     Last 2 w/ 11lb med ball press   Gait Training            Treadmill 8% incline w/ B/L UE  1 6 mph  5 min    5% incline w/ B/L UE  Step over cones  1 4 mph  5 min  5% incline w/ B/L U/E  1 4 mph    10min   5% incline w/ B/L U/E  1 6 mph    10min 8% incline w/ B/L U/E  1 6 mph    10min   5% incline w/ B/L U/E 1 6 mph     10min 5% incline w/ B/L UE 1 8 mph    10 min 5% incline w/ B/L UE 1 8 mph  10 min 8% incline  W/ B/L UE  1 6 mph  10 min   Stairs No SPC  One railing    4 flights step-over-step W/ SPC    4 x 1/2 flight  W/ SPC    Full flight / 2 W/ SPC    Full flight x3 W/ SPC    Full flight x2 W/ SPC    Full flight x3 W/ SPC    Full flight x 1  Ascend step-over-step  No SPC  One Railing    3 flights step-over-step   Overground Walking  Fwd/Bkwd w/ balloon toss    2 x 50ft FWD/Lat/BKWD w/ SPC     4in curb    3 x 200ft     500 ft during session 500 ft during session w/ 10 STS no U/E        Patient Ed              10 min  -SPC use safet  -Household ambulation w/ SPC, community ambulation w/ RW  -Hydration 10 min  -HEP  -Increasing Daily PA  -Deep breathing    15 min  -Step tracking goals  -POC progression  -Increasing daily PA  -Returning to [address] [facility] [place]

## 2023-05-22 ENCOUNTER — APPOINTMENT (OUTPATIENT)
Dept: PHYSICAL THERAPY | Facility: REHABILITATION | Age: 88
End: 2023-05-22
Payer: COMMERCIAL

## 2023-05-24 ENCOUNTER — APPOINTMENT (OUTPATIENT)
Dept: PHYSICAL THERAPY | Facility: REHABILITATION | Age: 88
End: 2023-05-24
Payer: COMMERCIAL

## 2023-05-31 ENCOUNTER — APPOINTMENT (OUTPATIENT)
Dept: PHYSICAL THERAPY | Facility: REHABILITATION | Age: 88
End: 2023-05-31
Payer: COMMERCIAL

## 2023-06-09 ENCOUNTER — TELEPHONE (OUTPATIENT)
Dept: PULMONOLOGY | Facility: MEDICAL CENTER | Age: 88
End: 2023-06-09

## 2023-06-09 NOTE — TELEPHONE ENCOUNTER
Patient daughter Agustina Leo called stating patients Cardiologist in [place] would like the patient to have his Echo done there  Agustina Leo would like a call back from office to discuss how Dr Barbra Beck would like to move forward    [phone]

## 2023-06-09 NOTE — TELEPHONE ENCOUNTER
Hello, the pt's Cardiologist wants the pt to have an echo done on 6/20 in [facility] but we ordered one for her to do and it is scheduled on 7/18  I advised that we would prefer her to do the one we ordered but she is now calling back asking what to do  Please let me know, thank you!

## 2023-06-19 ENCOUNTER — TELEPHONE (OUTPATIENT)
Dept: PULMONOLOGY | Facility: CLINIC | Age: 88
End: 2023-06-19

## 2023-06-19 NOTE — TELEPHONE ENCOUNTER
Patients Cardiologist office at [facility] called requesting that our office cancel the prior auth for the echo that is scheduled for tomorrow  She states that this is because Kevin Chandler is going to get the echo done through [facility] and they can't schedule a new appointment until the prior auth is canceled  Please advise       [facility]- [phone]

## 2023-08-22 ENCOUNTER — TELEPHONE (OUTPATIENT)
Dept: FAMILY MEDICINE CLINIC | Facility: CLINIC | Age: 88
End: 2023-08-22

## 2023-08-22 NOTE — TELEPHONE ENCOUNTER
CALLED TO LET THE OFFICE KNOW THAT PATIENT IS LIVING IN [place] NOW WONT  N Stevenson TO OUR OFFICE ANYMORE.

## 2023-10-30 ENCOUNTER — TELEPHONE (OUTPATIENT)
Dept: FAMILY MEDICINE CLINIC | Facility: CLINIC | Age: 88
End: 2023-10-30

## 2023-10-30 NOTE — TELEPHONE ENCOUNTER
On 9/18/2023 patient established care with Mimi Blas MD   [address], [address] [place] [address]   [phone] (Work)   [phone] (Fax)

## 2023-10-30 NOTE — TELEPHONE ENCOUNTER
10/30/23 4:09 PM        The office's request has been received, reviewed, and the patient chart updated. The PCP has successfully been removed with a patient attribution note. This message will now be completed.         Thank you  Gail Almodovar

## 2024-04-11 ENCOUNTER — TRANSCRIBE ORDERS (OUTPATIENT)
Dept: SCHEDULING | Age: 88
End: 2024-04-11

## 2024-04-11 DIAGNOSIS — I26.99 OTHER PULMONARY EMBOLISM WITHOUT ACUTE COR PULMONALE: Primary | ICD-10-CM

## 2024-05-01 ENCOUNTER — HOSPITAL ENCOUNTER (OUTPATIENT)
Dept: RADIOLOGY | Facility: HOSPITAL | Age: 88
Discharge: HOME | End: 2024-05-01
Attending: INTERNAL MEDICINE
Payer: COMMERCIAL

## 2024-05-01 DIAGNOSIS — I26.99 OTHER PULMONARY EMBOLISM WITHOUT ACUTE COR PULMONALE: ICD-10-CM

## 2024-05-01 PROCEDURE — 74176 CT ABD & PELVIS W/O CONTRAST: CPT

## 2024-05-06 ENCOUNTER — LAB REQUISITION (OUTPATIENT)
Dept: LAB | Facility: HOSPITAL | Age: 88
End: 2024-05-06
Attending: INTERNAL MEDICINE
Payer: COMMERCIAL

## 2024-05-06 DIAGNOSIS — I50.20 UNSPECIFIED SYSTOLIC (CONGESTIVE) HEART FAILURE (CMS/HCC): ICD-10-CM

## 2024-05-06 DIAGNOSIS — Z86.718 PERSONAL HISTORY OF OTHER VENOUS THROMBOSIS AND EMBOLISM: ICD-10-CM

## 2024-05-06 DIAGNOSIS — R26.89 OTHER ABNORMALITIES OF GAIT AND MOBILITY: ICD-10-CM

## 2024-05-06 LAB
ALBUMIN SERPL-MCNC: 3.3 G/DL (ref 3.5–5.7)
ALP SERPL-CCNC: 57 IU/L (ref 34–125)
ALT SERPL-CCNC: 9 IU/L (ref 7–52)
ANION GAP SERPL CALC-SCNC: 9 MEQ/L (ref 3–15)
AST SERPL-CCNC: 12 IU/L (ref 13–39)
BASOPHILS # BLD: 0.07 K/UL (ref 0.01–0.1)
BASOPHILS NFR BLD: 0.9 %
BILIRUB SERPL-MCNC: 0.3 MG/DL (ref 0.3–1.2)
BUN SERPL-MCNC: 22 MG/DL (ref 7–25)
CALCIUM SERPL-MCNC: 8.8 MG/DL (ref 8.6–10.3)
CHLORIDE SERPL-SCNC: 110 MEQ/L (ref 98–107)
CO2 SERPL-SCNC: 25 MEQ/L (ref 21–31)
CREAT SERPL-MCNC: 1 MG/DL (ref 0.7–1.3)
DIFFERENTIAL METHOD BLD: ABNORMAL
EGFRCR SERPLBLD CKD-EPI 2021: >60 ML/MIN/1.73M*2
EOSINOPHIL # BLD: 0.39 K/UL (ref 0.04–0.54)
EOSINOPHIL NFR BLD: 4.8 %
ERYTHROCYTE [DISTWIDTH] IN BLOOD BY AUTOMATED COUNT: 15.3 % (ref 11.6–14.4)
FERRITIN SERPL-MCNC: 125 NG/ML (ref 24–250)
FOLATE SERPL-MCNC: 6.8 NG/ML
GLUCOSE SERPL-MCNC: 105 MG/DL (ref 70–99)
HCT VFR BLD AUTO: 38.4 % (ref 40.1–51)
HGB BLD-MCNC: 12.1 G/DL (ref 13.7–17.5)
IMM GRANULOCYTES # BLD AUTO: 0.02 K/UL (ref 0–0.08)
IMM GRANULOCYTES NFR BLD AUTO: 0.2 %
IRON SERPL-MCNC: 55 UG/DL (ref 35–150)
LYMPHOCYTES # BLD: 2.28 K/UL (ref 1.2–3.5)
LYMPHOCYTES NFR BLD: 28.1 %
MCH RBC QN AUTO: 31.7 PG (ref 28–33.2)
MCHC RBC AUTO-ENTMCNC: 31.5 G/DL (ref 32.2–36.5)
MCV RBC AUTO: 100.5 FL (ref 83–98)
MONOCYTES # BLD: 0.88 K/UL (ref 0.3–1)
MONOCYTES NFR BLD: 10.9 %
NEUTROPHILS # BLD: 4.46 K/UL (ref 1.7–7)
NEUTS SEG NFR BLD: 55.1 %
NRBC BLD-RTO: 0 %
PDW BLD AUTO: 12.3 FL (ref 9.4–12.4)
PLATELET # BLD AUTO: 180 K/UL (ref 150–350)
POTASSIUM SERPL-SCNC: 4.4 MEQ/L (ref 3.5–5.1)
PROT SERPL-MCNC: 5.4 G/DL (ref 6–8.2)
RBC # BLD AUTO: 3.82 M/UL (ref 4.5–5.8)
SODIUM SERPL-SCNC: 144 MEQ/L (ref 136–145)
VIT B12 SERPL-MCNC: 191 PG/ML (ref 180–914)
WBC # BLD AUTO: 8.1 K/UL (ref 3.8–10.5)

## 2024-05-06 PROCEDURE — 82607 VITAMIN B-12: CPT | Performed by: INTERNAL MEDICINE

## 2024-05-06 PROCEDURE — 83540 ASSAY OF IRON: CPT | Performed by: INTERNAL MEDICINE

## 2024-05-06 PROCEDURE — 82746 ASSAY OF FOLIC ACID SERUM: CPT | Performed by: INTERNAL MEDICINE

## 2024-05-06 PROCEDURE — 80053 COMPREHEN METABOLIC PANEL: CPT | Performed by: INTERNAL MEDICINE

## 2024-05-06 PROCEDURE — 85025 COMPLETE CBC W/AUTO DIFF WBC: CPT | Performed by: INTERNAL MEDICINE

## 2024-05-06 PROCEDURE — 82728 ASSAY OF FERRITIN: CPT | Performed by: INTERNAL MEDICINE

## 2024-10-17 NOTE — ASSESSMENT & PLAN NOTE
Blood pressure rechecked by me 130/60, continue metoprolol which was changed to succinate, he will continue 12 5 mg daily  0